# Patient Record
Sex: FEMALE | Race: WHITE | Employment: FULL TIME | URBAN - NONMETROPOLITAN AREA
[De-identification: names, ages, dates, MRNs, and addresses within clinical notes are randomized per-mention and may not be internally consistent; named-entity substitution may affect disease eponyms.]

---

## 2017-03-20 LAB
ALT SERPL-CCNC: 9 IU/L (ref 4–30)
AST SERPL-CCNC: 15 IU/L (ref 17–33)
CREAT SERPL-MCNC: 0.69 MG/DL (ref 0.6–1.3)
GLUCOSE SERPL-MCNC: 89 MG/DL (ref 78–113)
POTASSIUM SERPL-SCNC: 4.1 MMOL/L (ref 3.7–5)

## 2017-09-28 ENCOUNTER — TRANSFERRED RECORDS (OUTPATIENT)
Dept: HEALTH INFORMATION MANAGEMENT | Facility: HOSPITAL | Age: 32
End: 2017-09-28

## 2017-10-22 ENCOUNTER — HOSPITAL ENCOUNTER (INPATIENT)
Facility: HOSPITAL | Age: 32
LOS: 3 days | Discharge: HOME | End: 2017-10-25
Attending: OBSTETRICS & GYNECOLOGY | Admitting: OBSTETRICS & GYNECOLOGY
Payer: COMMERCIAL

## 2017-10-22 ENCOUNTER — TRANSFERRED RECORDS (OUTPATIENT)
Dept: HEALTH INFORMATION MANAGEMENT | Facility: HOSPITAL | Age: 32
End: 2017-10-22

## 2017-10-22 DIAGNOSIS — O92.29 SORE NIPPLES DUE TO LACTATION: ICD-10-CM

## 2017-10-22 DIAGNOSIS — D62 ANEMIA DUE TO BLOOD LOSS, ACUTE: ICD-10-CM

## 2017-10-22 PROBLEM — Z37.9 NORMAL LABOR: Status: ACTIVE | Noted: 2017-10-22

## 2017-10-22 LAB
AMPHETAMINES UR QL SCN: NEGATIVE
BARBITURATES UR QL: NEGATIVE
BASOPHILS # BLD AUTO: 0 10E9/L (ref 0–0.2)
BASOPHILS NFR BLD AUTO: 0.2 %
BENZODIAZ UR QL: NEGATIVE
CANNABINOIDS UR QL SCN: NEGATIVE
COCAINE UR QL: NEGATIVE
DIFFERENTIAL METHOD BLD: ABNORMAL
EOSINOPHIL # BLD AUTO: 0.1 10E9/L (ref 0–0.7)
EOSINOPHIL NFR BLD AUTO: 0.5 %
ERYTHROCYTE [DISTWIDTH] IN BLOOD BY AUTOMATED COUNT: 15.9 % (ref 10–15)
HCT VFR BLD AUTO: 37 % (ref 35–47)
HGB BLD-MCNC: 12.4 G/DL (ref 11.7–15.7)
IMM GRANULOCYTES # BLD: 0.1 10E9/L (ref 0–0.4)
IMM GRANULOCYTES NFR BLD: 0.5 %
LYMPHOCYTES # BLD AUTO: 2.2 10E9/L (ref 0.8–5.3)
LYMPHOCYTES NFR BLD AUTO: 17 %
MCH RBC QN AUTO: 27.3 PG (ref 26.5–33)
MCHC RBC AUTO-ENTMCNC: 33.5 G/DL (ref 31.5–36.5)
MCV RBC AUTO: 82 FL (ref 78–100)
METHADONE UR QL SCN: NEGATIVE
MONOCYTES # BLD AUTO: 0.9 10E9/L (ref 0–1.3)
MONOCYTES NFR BLD AUTO: 6.7 %
NEUTROPHILS # BLD AUTO: 9.7 10E9/L (ref 1.6–8.3)
NEUTROPHILS NFR BLD AUTO: 75.1 %
NRBC # BLD AUTO: 0 10*3/UL
NRBC BLD AUTO-RTO: 0 /100
OPIATES UR QL SCN: NEGATIVE
PCP UR QL SCN: NEGATIVE
PLATELET # BLD AUTO: 299 10E9/L (ref 150–450)
RBC # BLD AUTO: 4.54 10E12/L (ref 3.8–5.2)
WBC # BLD AUTO: 12.9 10E9/L (ref 4–11)

## 2017-10-22 PROCEDURE — 25000128 H RX IP 250 OP 636: Performed by: OBSTETRICS & GYNECOLOGY

## 2017-10-22 PROCEDURE — 86780 TREPONEMA PALLIDUM: CPT | Performed by: OBSTETRICS & GYNECOLOGY

## 2017-10-22 PROCEDURE — 12000027 ZZH R&B OB

## 2017-10-22 PROCEDURE — 86850 RBC ANTIBODY SCREEN: CPT | Performed by: OBSTETRICS & GYNECOLOGY

## 2017-10-22 PROCEDURE — 80307 DRUG TEST PRSMV CHEM ANLYZR: CPT | Performed by: OBSTETRICS & GYNECOLOGY

## 2017-10-22 PROCEDURE — 36415 COLL VENOUS BLD VENIPUNCTURE: CPT | Performed by: OBSTETRICS & GYNECOLOGY

## 2017-10-22 PROCEDURE — 86900 BLOOD TYPING SEROLOGIC ABO: CPT | Performed by: OBSTETRICS & GYNECOLOGY

## 2017-10-22 PROCEDURE — 86901 BLOOD TYPING SEROLOGIC RH(D): CPT | Performed by: OBSTETRICS & GYNECOLOGY

## 2017-10-22 PROCEDURE — 85025 COMPLETE CBC W/AUTO DIFF WBC: CPT | Performed by: OBSTETRICS & GYNECOLOGY

## 2017-10-22 RX ORDER — EPHEDRINE SULFATE 50 MG/ML
INJECTION, SOLUTION INTRAMUSCULAR; INTRAVENOUS; SUBCUTANEOUS
Status: DISCONTINUED
Start: 2017-10-22 | End: 2017-10-23 | Stop reason: HOSPADM

## 2017-10-22 RX ORDER — ACETAMINOPHEN 325 MG/1
650 TABLET ORAL EVERY 4 HOURS PRN
Status: DISCONTINUED | OUTPATIENT
Start: 2017-10-22 | End: 2017-10-23

## 2017-10-22 RX ORDER — NALOXONE HYDROCHLORIDE 0.4 MG/ML
.1-.4 INJECTION, SOLUTION INTRAMUSCULAR; INTRAVENOUS; SUBCUTANEOUS
Status: DISCONTINUED | OUTPATIENT
Start: 2017-10-22 | End: 2017-10-23

## 2017-10-22 RX ORDER — ONDANSETRON 2 MG/ML
4 INJECTION INTRAMUSCULAR; INTRAVENOUS EVERY 6 HOURS PRN
Status: DISCONTINUED | OUTPATIENT
Start: 2017-10-22 | End: 2017-10-23

## 2017-10-22 RX ORDER — METHYLERGONOVINE MALEATE 0.2 MG/ML
200 INJECTION INTRAVENOUS
Status: DISCONTINUED | OUTPATIENT
Start: 2017-10-22 | End: 2017-10-25 | Stop reason: HOSPADM

## 2017-10-22 RX ORDER — FENTANYL CITRATE 50 UG/ML
50-100 INJECTION, SOLUTION INTRAMUSCULAR; INTRAVENOUS
Status: DISCONTINUED | OUTPATIENT
Start: 2017-10-22 | End: 2017-10-25 | Stop reason: HOSPADM

## 2017-10-22 RX ORDER — IBUPROFEN 800 MG/1
800 TABLET, FILM COATED ORAL
Status: DISCONTINUED | OUTPATIENT
Start: 2017-10-22 | End: 2017-10-25 | Stop reason: HOSPADM

## 2017-10-22 RX ORDER — SODIUM CHLORIDE, SODIUM LACTATE, POTASSIUM CHLORIDE, CALCIUM CHLORIDE 600; 310; 30; 20 MG/100ML; MG/100ML; MG/100ML; MG/100ML
INJECTION, SOLUTION INTRAVENOUS CONTINUOUS
Status: DISCONTINUED | OUTPATIENT
Start: 2017-10-22 | End: 2017-10-25 | Stop reason: HOSPADM

## 2017-10-22 RX ORDER — OXYCODONE AND ACETAMINOPHEN 5; 325 MG/1; MG/1
1 TABLET ORAL
Status: DISCONTINUED | OUTPATIENT
Start: 2017-10-22 | End: 2017-10-25 | Stop reason: HOSPADM

## 2017-10-22 RX ORDER — CARBOPROST TROMETHAMINE 250 UG/ML
250 INJECTION, SOLUTION INTRAMUSCULAR
Status: DISCONTINUED | OUTPATIENT
Start: 2017-10-22 | End: 2017-10-25 | Stop reason: HOSPADM

## 2017-10-22 RX ORDER — OXYTOCIN/0.9 % SODIUM CHLORIDE 30/500 ML
100-340 PLASTIC BAG, INJECTION (ML) INTRAVENOUS CONTINUOUS PRN
Status: DISCONTINUED | OUTPATIENT
Start: 2017-10-22 | End: 2017-10-25 | Stop reason: HOSPADM

## 2017-10-22 RX ORDER — OXYTOCIN 10 [USP'U]/ML
10 INJECTION, SOLUTION INTRAMUSCULAR; INTRAVENOUS
Status: COMPLETED | OUTPATIENT
Start: 2017-10-22 | End: 2017-10-23

## 2017-10-22 RX ADMIN — SODIUM CHLORIDE, POTASSIUM CHLORIDE, SODIUM LACTATE AND CALCIUM CHLORIDE 500 ML: 600; 310; 30; 20 INJECTION, SOLUTION INTRAVENOUS at 00:15

## 2017-10-22 RX ADMIN — SODIUM CHLORIDE, POTASSIUM CHLORIDE, SODIUM LACTATE AND CALCIUM CHLORIDE 1000 ML: 600; 310; 30; 20 INJECTION, SOLUTION INTRAVENOUS at 02:00

## 2017-10-22 RX ADMIN — SODIUM CHLORIDE, POTASSIUM CHLORIDE, SODIUM LACTATE AND CALCIUM CHLORIDE: 600; 310; 30; 20 INJECTION, SOLUTION INTRAVENOUS at 00:45

## 2017-10-22 RX ADMIN — SODIUM CHLORIDE, POTASSIUM CHLORIDE, SODIUM LACTATE AND CALCIUM CHLORIDE 500 ML: 600; 310; 30; 20 INJECTION, SOLUTION INTRAVENOUS at 23:45

## 2017-10-22 NOTE — IP AVS SNAPSHOT
[facility]    [address] [address]    Phone:  [phone]    Fax:  [phone]                                       After Visit Summary   10/22/2017    Jodi Neri    MRN: [id number]           After Visit Summary Signature Page     I have received my discharge instructions, and my questions have been answered. I have discussed any challenges I see with this plan with the nurse or doctor.    ..........................................................................................................................................  Patient/Patient Representative Signature      ..........................................................................................................................................  Patient Representative Print Name and Relationship to Patient    ..................................................               ................................................  Date                                            Time    ..........................................................................................................................................  Reviewed by Signature/Title    ...................................................              ..............................................  Date                                                            Time

## 2017-10-22 NOTE — IP AVS SNAPSHOT
MRN:[id number]                      After Visit Summary   10/22/2017    Jodi Neri    MRN: [id number]           Thank you!     Thank you for choosing [facility] for your care. Our goal is always to provide you with excellent care. Hearing back from our patients is one way we can continue to improve our services. Please take a few minutes to complete the written survey that you may receive in the mail after you visit with us. Thank you!        Patient Information     Date Of Birth          1985        Designated Caregiver       Most Recent Value    Caregiver    Will someone help with your care after discharge? yes    Name of designated caregiver Austin     Phone number of caregiver [phone]    Caregiver address [address], [address]      About your hospital stay     You were admitted on:  2017 You last received care in the:  [facility]    You were discharged on:  2017        Reason for your hospital stay       Low segment transverse  section done for fetal intolerance of labor.                  Who to Call     For medical emergencies, please call 911.  For non-urgent questions about your medical care, please call your primary care provider or clinic, [phone]  For questions related to your surgery, please call your surgery clinic        Attending Provider     Provider Specialty    Kunal Aguilar MD OB/Gyn       Primary Care Provider Office Phone # Fax #    Michael ROSALBA Garcia [phone]      After Care Instructions     Activity       Your activity upon discharge: activity as tolerated    Avoid sex 6 weeks.            Diet       Follow this diet upon discharge: Regular            Wound care and dressings       Instructions to care for your wound at home: keep wound clean and dry.  Wound may be washed with water and dried.                  Follow-up Appointments     Follow-up and recommended labs and tests         Staples may be removed at [facility] next week Monday.  Post partum follow up in 6 weeks at [facility].  See me here if having problems with the surgery.                  Further instructions from your care team       Follow up appointment with Dr. Crespo on 2017 at 11:20 AM.    Postop  Birth Instructions    Activity       Do not lift more than 10 pounds for 6 weeks after surgery.  Ask family and friends for help when you need it.    No driving until you have stopped taking your pain medications (usually two weeks after surgery).    No heavy exercise or activity for 6 weeks.  Don't do anything that will put a strain on your surgery site.    Don't strain when using the toilet.  Your care team may prescribe a stool softener if you have problems with your bowel movements.     To care for your incision:       Keep the incision clean and dry.    Do not soak your incision in water. No swimming or hot tubs until it has fully healed. You may soak in the bathtub if the water level is below your incision.    Do not use peroxide, gel, cream, lotion, or ointment on your incision.    Adjust your clothes to avoid pressure on your surgery site (check the elastic in your underwear for example).     You may see a small amount of clear or pink drainage and this is normal.  Check with your health care provider:       If the drainage increases or has an odor.    If the incision reddens, you have swelling, or develop a rash.    If you have increased pain and the medicine we prescribed doesn't help.    If you have a fever above 100.4 F (38 C) with or without chills when placing thermometer under your tongue.   The area around your incision (surgery wound), will feel numb.  This is normal. The numbness should go away in less than a year.     Keep your hands clean:  Always wash your hands before touching your incision (surgery wound). This helps reduce your risk of infection. If your hands  "aren't dirty, you may use an alcohol hand-rub to clean your hands. Keep your nails clean and short.    Call your healthcare provider if you have any of these symptoms:       You soak a sanitary pad with blood within 1 hour, or you see blood clots larger than a golf ball.    Bleeding that lasts more than 6 weeks.    Vaginal discharge that smells bad.    Severe pain, cramping or tenderness in your lower belly area.    A need to urinate more frequently (use the toilet more often), more urgently (use the toilet very quickly), or it burns when you urinate.    Nausea and vomiting.    Redness, swelling or pain around a vein in your leg.    Problems breastfeeding or a red or painful area on your breast.    Chest pain and cough or are gasping for air.    Problems with coping with sadness, anxiety or depression. If you have concerns about hurting yourself or the baby, call your provider immediately.      You have questions or concerns after you return home.                  Pending Results     No orders found from 10/20/2017 to 10/23/2017.            Statement of Approval     Ordered          10/25/17 0906  I have reviewed and agree with all the recommendations and orders detailed in this document.  EFFECTIVE NOW     Approved and electronically signed by:  Kunal Aguilar MD             Admission Information     Date & Time Provider Department Dept. Phone    10/22/2017 Kunal Aguilar MD [facility] [phone]      Your Vitals Were     Blood Pressure Pulse Temperature Respirations Last Period Pulse Oximetry    122/62 98 98.3  F (36.8  C) (Oral) 16 01/19/2017 96%      MyChart Information     [facility] lets you send messages to your doctor, view your test results, renew your prescriptions, schedule appointments and more. To sign up, go to [email or web address] . Click on \"Log in\" on the left side of the screen, which will take you to the Welcome page. Then click on \"Sign up Now\" on the right side of the page.     You " will be asked to enter the access code listed below, as well as some personal information. Please follow the directions to create your username and password.     Your access code is: [email or web address]  Expires: 2018  8:29 AM     Your access code will  in 90 days. If you need help or a new code, please call your [facility] clinic or [phone].        Care EveryWhere ID     This is your Care EveryWhere ID. This could be used by other organizations to access your [facility] medical records  [id number]        Equal Access to Services     [facility]: Hadii bert hebert Sobarb, waaxda ludanaeadaha, qaybfrancisco javier kaalmamarlon anguiano, iglesia carrington . So [facility] [phone].    ATENCIÓN: Si habla español, tiene a serna disposición servicios gratuitos de asistencia lingüística. Llame al [phone].    We comply with applicable federal civil rights laws and [place] laws. We do not discriminate on the basis of race, color, national origin, age, disability, sex, sexual orientation, or gender identity.               Review of your medicines      START taking        Dose / Directions    cephALEXin 500 MG capsule   Commonly known as:  KEFLEX   Indication:  Possible post-op infection, code pink  section        Dose:  500 mg   Take 1 capsule (500 mg) by mouth 3 times daily for 6 days   Quantity:  18 capsule   Refills:  0       ferrous sulfate 325 (65 FE) MG tablet   Commonly known as:  IRON   Used for:  Anemia due to blood loss, acute        Dose:  325 mg   Take 1 tablet (325 mg) by mouth 2 times daily   Quantity:  100 tablet   Refills:  1       ibuprofen 400 MG tablet   Commonly known as:  ADVIL/MOTRIN        Dose:  800 mg   Take 2 tablets (800 mg) by mouth every 6 hours as needed for other (cramping)   Quantity:  100 tablet   Refills:  2       lanolin ointment   Used for:  Sore nipples due to lactation        Apply topically every hour as needed for dry skin (soreness)   Quantity:  40 g   Refills:   3       oxyCODONE 5 MG IR tablet   Commonly known as:  ROXICODONE        Dose:  5 mg   Take 1 tablet (5 mg) by mouth every 3 hours as needed for moderate to severe pain   Quantity:  36 tablet   Refills:  0       senna-docusate 8.6-50 MG per tablet   Commonly known as:  SENOKOT-S;PERICOLACE        Dose:  1-2 tablet   Take 1-2 tablets by mouth 2 times daily   Quantity:  60 tablet   Refills:  1         CONTINUE these medicines which have NOT CHANGED        Dose / Directions    fluticasone 50 MCG/ACT spray   Commonly known as:  FLONASE        Dose:  50 spray   Spray 50 sprays into both nostrils daily   Refills:  0       loratadine 10 MG tablet   Commonly known as:  CLARITIN        Dose:  10 mg   Take 10 mg by mouth daily as needed   Refills:  0       prenatal multivitamin plus iron 27-0.8 MG Tabs per tablet        Dose:  1 tablet   Take 1 tablet by mouth daily   Refills:  0       prochlorperazine 10 MG tablet   Commonly known as:  COMPAZINE        Dose:  5 mg   Take 5 mg by mouth daily as needed   Refills:  0            Where to get your medicines      These medications were sent to [id number] - [place] [place], [place] - [address]  [address], [facility] [address]     Phone:  [phone]     cephALEXin 500 MG capsule    ferrous sulfate 325 (65 FE) MG tablet    ibuprofen 400 MG tablet    lanolin ointment    senna-docusate 8.6-50 MG per tablet         Some of these will need a paper prescription and others can be bought over the counter. Ask your nurse if you have questions.     Bring a paper prescription for each of these medications     oxyCODONE 5 MG IR tablet               ANTIBIOTIC INSTRUCTION     You've Been Prescribed an Antibiotic - Now What?  Your healthcare team thinks that you or your loved one might have an infection. Some infections can be treated with antibiotics, which are powerful, life-saving drugs. Like all medications, antibiotics have side effects and should only be used when  necessary. There are some important things you should know about your antibiotic treatment.      Your healthcare team may run tests before you start taking an antibiotic.    Your team may take samples (e.g., from your blood, urine or other areas) to run tests to look for bacteria. These test can be important to determine if you need an antibiotic at all and, if you do, which antibiotic will work best.      Within a few days, your healthcare team might change or even stop your antibiotic.    Your team may start you on an antibiotic while they are working to find out what is making you sick.    Your team might change your antibiotic because test results show that a different antibiotic would be better to treat your infection.    In some cases, once your team has more information, they learn that you do not need an antibiotic at all. They may find out that you don't have an infection, or that the antibiotic you're taking won't work against your infection. For example, an infection caused by a virus can't be treated with antibiotics. Staying on an antibiotic when you don't need it is more likely to be harmful than helpful.      You may experience side effects from your antibiotic.    Like all medications, antibiotics have side effects. Some of these can be serious.    Let you healthcare team know if you have any known allergies when you are admitted to the hospital.    One significant side effect of nearly all antibiotics is the risk of severe and sometimes deadly diarrhea caused by Clostridium difficile (C. Difficile). This occurs when a person takes antibiotics because some good germs are destroyed. Antibiotic use allows C. diificile to take over, putting patients at high risk for this serious infection.    As a patient or caregiver, it is important to understand your or your loved one's antibiotic treatment. It is especially important for caregivers to speak up when patients can't speak for themselves. Here are some  important questions to ask your healthcare team.    What infection is this antibiotic treating and how do you know I have that infection?    What side effects might occur from this antibiotic?    How long will I need to take this antibiotic?    Is it safe to take this antibiotic with other medications or supplements (e.g., vitamins) that I am taking?     Are there any special directions I need to know about taking this antibiotic? For example, should I take it with food?    How will I be monitored to know whether my infection is responding to the antibiotic?    What tests may help to make sure the right antibiotic is prescribed for me?      Information provided by:  www.cdc.gov/getsmart  U.S. Department of Health and Human Services  Centers for disease Control and Prevention  National Center for Emerging and Zoonotic Infectious Diseases  Division of Healthcare Quality Promotion         Protect others around you: Learn how to safely use, store and throw away your medicines at www.disposemymeds.org.             Medication List: This is a list of all your medications and when to take them. Check marks below indicate your daily home schedule. Keep this list as a reference.      Medications           Morning Afternoon Evening Bedtime As Needed    cephALEXin 500 MG capsule   Commonly known as:  KEFLEX   Take 1 capsule (500 mg) by mouth 3 times daily for 6 days   Last time this was given:  500 mg on 10/25/2017  2:20 PM                                ferrous sulfate 325 (65 FE) MG tablet   Commonly known as:  IRON   Take 1 tablet (325 mg) by mouth 2 times daily   Last time this was given:  325 mg on 10/25/2017  9:37 AM                                fluticasone 50 MCG/ACT spray   Commonly known as:  FLONASE   Spray 50 sprays into both nostrils daily                                ibuprofen 400 MG tablet   Commonly known as:  ADVIL/MOTRIN   Take 2 tablets (800 mg) by mouth every 6 hours as needed for other (cramping)   Last  time this was given:  800 mg on 10/25/2017  8:08 AM                                lanolin ointment   Apply topically every hour as needed for dry skin (soreness)                                loratadine 10 MG tablet   Commonly known as:  CLARITIN   Take 10 mg by mouth daily as needed                                oxyCODONE 5 MG IR tablet   Commonly known as:  ROXICODONE   Take 1 tablet (5 mg) by mouth every 3 hours as needed for moderate to severe pain   Last time this was given:  10 mg on 10/25/2017  2:19 PM                                prenatal multivitamin plus iron 27-0.8 MG Tabs per tablet   Take 1 tablet by mouth daily                                prochlorperazine 10 MG tablet   Commonly known as:  COMPAZINE   Take 5 mg by mouth daily as needed                                senna-docusate 8.6-50 MG per tablet   Commonly known as:  SENOKOT-S;PERICOLACE   Take 1-2 tablets by mouth 2 times daily   Last time this was given:  1 tablet on 10/25/2017  9:37 AM

## 2017-10-23 ENCOUNTER — ANESTHESIA EVENT (OUTPATIENT)
Dept: SURGERY | Facility: HOSPITAL | Age: 32
End: 2017-10-23
Payer: COMMERCIAL

## 2017-10-23 ENCOUNTER — ANESTHESIA EVENT (OUTPATIENT)
Dept: OBGYN | Facility: HOSPITAL | Age: 32
End: 2017-10-23
Payer: COMMERCIAL

## 2017-10-23 ENCOUNTER — APPOINTMENT (OUTPATIENT)
Dept: GENERAL RADIOLOGY | Facility: HOSPITAL | Age: 32
End: 2017-10-23
Attending: OBSTETRICS & GYNECOLOGY
Payer: COMMERCIAL

## 2017-10-23 ENCOUNTER — ANESTHESIA (OUTPATIENT)
Dept: OBGYN | Facility: HOSPITAL | Age: 32
End: 2017-10-23
Payer: COMMERCIAL

## 2017-10-23 ENCOUNTER — ANESTHESIA (OUTPATIENT)
Dept: SURGERY | Facility: HOSPITAL | Age: 32
End: 2017-10-23
Payer: COMMERCIAL

## 2017-10-23 LAB
ABO + RH BLD: NORMAL
ABO + RH BLD: NORMAL
BLD GP AB SCN SERPL QL: NORMAL
BLOOD BANK CMNT PATIENT-IMP: NORMAL
BLOOD BANK CMNT PATIENT-IMP: NORMAL
SPECIMEN EXP DATE BLD: NORMAL

## 2017-10-23 PROCEDURE — 25000128 H RX IP 250 OP 636: Performed by: OBSTETRICS & GYNECOLOGY

## 2017-10-23 PROCEDURE — 27210794 ZZH OR GENERAL SUPPLY STERILE: Performed by: OBSTETRICS & GYNECOLOGY

## 2017-10-23 PROCEDURE — 3E0R3BZ INTRODUCTION OF ANESTHETIC AGENT INTO SPINAL CANAL, PERCUTANEOUS APPROACH: ICD-10-PCS | Performed by: NURSE ANESTHETIST, CERTIFIED REGISTERED

## 2017-10-23 PROCEDURE — 25000132 ZZH RX MED GY IP 250 OP 250 PS 637: Performed by: OBSTETRICS & GYNECOLOGY

## 2017-10-23 PROCEDURE — 25000125 ZZHC RX 250: Performed by: NURSE ANESTHETIST, CERTIFIED REGISTERED

## 2017-10-23 PROCEDURE — 25000128 H RX IP 250 OP 636: Performed by: NURSE ANESTHETIST, CERTIFIED REGISTERED

## 2017-10-23 PROCEDURE — 40000275 ZZH STATISTIC RCP TIME EA 10 MIN

## 2017-10-23 PROCEDURE — 59514 CESAREAN DELIVERY ONLY: CPT | Performed by: OBSTETRICS & GYNECOLOGY

## 2017-10-23 PROCEDURE — S0020 INJECTION, BUPIVICAINE HYDRO: HCPCS | Performed by: NURSE ANESTHETIST, CERTIFIED REGISTERED

## 2017-10-23 PROCEDURE — 36000058 ZZH SURGERY LEVEL 3 EA 15 ADDTL MIN: Performed by: OBSTETRICS & GYNECOLOGY

## 2017-10-23 PROCEDURE — 00HU33Z INSERTION OF INFUSION DEVICE INTO SPINAL CANAL, PERCUTANEOUS APPROACH: ICD-10-PCS | Performed by: NURSE ANESTHETIST, CERTIFIED REGISTERED

## 2017-10-23 PROCEDURE — 37000011 ZZH ANESTHESIA WARD SERVICE: Performed by: NURSE ANESTHETIST, CERTIFIED REGISTERED

## 2017-10-23 PROCEDURE — 12000031 ZZH R&B OB CRITICAL

## 2017-10-23 PROCEDURE — 01999 UNLISTED ANES PROCEDURE: CPT | Performed by: NURSE ANESTHETIST, CERTIFIED REGISTERED

## 2017-10-23 PROCEDURE — 71000015 ZZH RECOVERY PHASE 1 LEVEL 2 EA ADDTL HR: Performed by: OBSTETRICS & GYNECOLOGY

## 2017-10-23 PROCEDURE — 25000125 ZZHC RX 250: Performed by: OBSTETRICS & GYNECOLOGY

## 2017-10-23 PROCEDURE — 37000008 ZZH ANESTHESIA TECHNICAL FEE, 1ST 30 MIN: Performed by: OBSTETRICS & GYNECOLOGY

## 2017-10-23 PROCEDURE — 71000014 ZZH RECOVERY PHASE 1 LEVEL 2 FIRST HR: Performed by: OBSTETRICS & GYNECOLOGY

## 2017-10-23 PROCEDURE — 25000566 ZZH SEVOFLURANE, EA 15 MIN: Performed by: NURSE ANESTHETIST, CERTIFIED REGISTERED

## 2017-10-23 PROCEDURE — 37000009 ZZH ANESTHESIA TECHNICAL FEE, EACH ADDTL 15 MIN: Performed by: OBSTETRICS & GYNECOLOGY

## 2017-10-23 PROCEDURE — 40000985 XR ABDOMEN PORT F1 VW: Mod: TC

## 2017-10-23 PROCEDURE — 36000056 ZZH SURGERY LEVEL 3 1ST 30 MIN: Performed by: OBSTETRICS & GYNECOLOGY

## 2017-10-23 RX ORDER — EPHEDRINE SULFATE 50 MG/ML
5 INJECTION, SOLUTION INTRAMUSCULAR; INTRAVENOUS; SUBCUTANEOUS
Status: DISCONTINUED | OUTPATIENT
Start: 2017-10-23 | End: 2017-10-25 | Stop reason: HOSPADM

## 2017-10-23 RX ORDER — LIDOCAINE 40 MG/G
CREAM TOPICAL
Status: DISCONTINUED | OUTPATIENT
Start: 2017-10-23 | End: 2017-10-25 | Stop reason: HOSPADM

## 2017-10-23 RX ORDER — MISOPROSTOL 200 UG/1
400 TABLET ORAL
Status: DISCONTINUED | OUTPATIENT
Start: 2017-10-23 | End: 2017-10-25 | Stop reason: HOSPADM

## 2017-10-23 RX ORDER — ONDANSETRON 4 MG/1
4 TABLET, ORALLY DISINTEGRATING ORAL EVERY 6 HOURS PRN
Status: DISCONTINUED | OUTPATIENT
Start: 2017-10-23 | End: 2017-10-25 | Stop reason: HOSPADM

## 2017-10-23 RX ORDER — PROPOFOL 10 MG/ML
INJECTION, EMULSION INTRAVENOUS PRN
Status: DISCONTINUED | OUTPATIENT
Start: 2017-10-23 | End: 2017-10-23

## 2017-10-23 RX ORDER — FENTANYL CITRATE 50 UG/ML
INJECTION, SOLUTION INTRAMUSCULAR; INTRAVENOUS PRN
Status: DISCONTINUED | OUTPATIENT
Start: 2017-10-23 | End: 2017-10-23

## 2017-10-23 RX ORDER — ROPIVACAINE HYDROCHLORIDE 2 MG/ML
8 INJECTION, SOLUTION EPIDURAL; INFILTRATION; PERINEURAL ONCE
Status: DISCONTINUED | OUTPATIENT
Start: 2017-10-23 | End: 2017-10-25 | Stop reason: HOSPADM

## 2017-10-23 RX ORDER — IBUPROFEN 400 MG/1
400-800 TABLET, FILM COATED ORAL EVERY 6 HOURS PRN
Status: DISCONTINUED | OUTPATIENT
Start: 2017-10-23 | End: 2017-10-25 | Stop reason: HOSPADM

## 2017-10-23 RX ORDER — PRENATAL VIT/IRON FUM/FOLIC AC 27MG-0.8MG
1 TABLET ORAL DAILY
COMMUNITY
Start: 2017-03-02

## 2017-10-23 RX ORDER — LANOLIN 100 %
OINTMENT (GRAM) TOPICAL
Status: DISCONTINUED | OUTPATIENT
Start: 2017-10-23 | End: 2017-10-25 | Stop reason: HOSPADM

## 2017-10-23 RX ORDER — BACITRACIN ZINC 500 [USP'U]/G
OINTMENT TOPICAL PRN
Status: DISCONTINUED | OUTPATIENT
Start: 2017-10-23 | End: 2017-10-23 | Stop reason: HOSPADM

## 2017-10-23 RX ORDER — CEFAZOLIN SODIUM 1 G/3ML
INJECTION, POWDER, FOR SOLUTION INTRAMUSCULAR; INTRAVENOUS PRN
Status: DISCONTINUED | OUTPATIENT
Start: 2017-10-23 | End: 2017-10-23

## 2017-10-23 RX ORDER — OXYTOCIN/0.9 % SODIUM CHLORIDE 30/500 ML
340 PLASTIC BAG, INJECTION (ML) INTRAVENOUS CONTINUOUS PRN
Status: DISCONTINUED | OUTPATIENT
Start: 2017-10-23 | End: 2017-10-25 | Stop reason: HOSPADM

## 2017-10-23 RX ORDER — DEXTROSE, SODIUM CHLORIDE, SODIUM LACTATE, POTASSIUM CHLORIDE, AND CALCIUM CHLORIDE 5; .6; .31; .03; .02 G/100ML; G/100ML; G/100ML; G/100ML; G/100ML
INJECTION, SOLUTION INTRAVENOUS CONTINUOUS
Status: DISCONTINUED | OUTPATIENT
Start: 2017-10-23 | End: 2017-10-25 | Stop reason: HOSPADM

## 2017-10-23 RX ORDER — HYDROMORPHONE HYDROCHLORIDE 1 MG/ML
.3-.5 INJECTION, SOLUTION INTRAMUSCULAR; INTRAVENOUS; SUBCUTANEOUS EVERY 30 MIN PRN
Status: DISCONTINUED | OUTPATIENT
Start: 2017-10-23 | End: 2017-10-25 | Stop reason: HOSPADM

## 2017-10-23 RX ORDER — PROCHLORPERAZINE MALEATE 10 MG
5 TABLET ORAL DAILY PRN
COMMUNITY
Start: 2016-05-27

## 2017-10-23 RX ORDER — LABETALOL HYDROCHLORIDE 5 MG/ML
10 INJECTION, SOLUTION INTRAVENOUS
Status: DISCONTINUED | OUTPATIENT
Start: 2017-10-23 | End: 2017-10-23 | Stop reason: HOSPADM

## 2017-10-23 RX ORDER — BUPIVACAINE HYDROCHLORIDE 2.5 MG/ML
INJECTION, SOLUTION INFILTRATION; PERINEURAL PRN
Status: DISCONTINUED | OUTPATIENT
Start: 2017-10-23 | End: 2019-03-21

## 2017-10-23 RX ORDER — FLUTICASONE PROPIONATE 50 MCG
50 SPRAY, SUSPENSION (ML) NASAL DAILY
COMMUNITY
Start: 2017-09-11

## 2017-10-23 RX ORDER — SUFENTANIL CITRATE 50 UG/ML
INJECTION EPIDURAL; INTRAVENOUS
Status: COMPLETED
Start: 2017-10-23 | End: 2017-10-23

## 2017-10-23 RX ORDER — OXYCODONE HYDROCHLORIDE 5 MG/1
5-10 TABLET ORAL
Status: DISCONTINUED | OUTPATIENT
Start: 2017-10-23 | End: 2017-10-25 | Stop reason: HOSPADM

## 2017-10-23 RX ORDER — BISACODYL 10 MG
10 SUPPOSITORY, RECTAL RECTAL DAILY PRN
Status: DISCONTINUED | OUTPATIENT
Start: 2017-10-25 | End: 2017-10-25 | Stop reason: HOSPADM

## 2017-10-23 RX ORDER — OXYTOCIN/0.9 % SODIUM CHLORIDE 30/500 ML
100 PLASTIC BAG, INJECTION (ML) INTRAVENOUS CONTINUOUS
Status: DISCONTINUED | OUTPATIENT
Start: 2017-10-23 | End: 2017-10-25 | Stop reason: HOSPADM

## 2017-10-23 RX ORDER — ONDANSETRON 4 MG/1
4 TABLET, ORALLY DISINTEGRATING ORAL EVERY 30 MIN PRN
Status: DISCONTINUED | OUTPATIENT
Start: 2017-10-23 | End: 2017-10-23 | Stop reason: HOSPADM

## 2017-10-23 RX ORDER — LIDOCAINE HYDROCHLORIDE AND EPINEPHRINE 15; 5 MG/ML; UG/ML
INJECTION, SOLUTION EPIDURAL PRN
Status: DISCONTINUED | OUTPATIENT
Start: 2017-10-23 | End: 2017-10-23

## 2017-10-23 RX ORDER — ONDANSETRON 2 MG/ML
4 INJECTION INTRAMUSCULAR; INTRAVENOUS EVERY 6 HOURS PRN
Status: DISCONTINUED | OUTPATIENT
Start: 2017-10-23 | End: 2017-10-25 | Stop reason: HOSPADM

## 2017-10-23 RX ORDER — ONDANSETRON 2 MG/ML
4 INJECTION INTRAMUSCULAR; INTRAVENOUS EVERY 30 MIN PRN
Status: DISCONTINUED | OUTPATIENT
Start: 2017-10-23 | End: 2017-10-23 | Stop reason: HOSPADM

## 2017-10-23 RX ORDER — ACETAMINOPHEN 325 MG/1
650 TABLET ORAL EVERY 4 HOURS PRN
Status: DISCONTINUED | OUTPATIENT
Start: 2017-10-26 | End: 2017-10-25 | Stop reason: HOSPADM

## 2017-10-23 RX ORDER — CARBOPROST TROMETHAMINE 250 UG/ML
INJECTION, SOLUTION INTRAMUSCULAR PRN
Status: DISCONTINUED | OUTPATIENT
Start: 2017-10-23 | End: 2017-10-23

## 2017-10-23 RX ORDER — AMOXICILLIN 250 MG
1-2 CAPSULE ORAL 2 TIMES DAILY
Status: DISCONTINUED | OUTPATIENT
Start: 2017-10-23 | End: 2017-10-25 | Stop reason: HOSPADM

## 2017-10-23 RX ORDER — CEFAZOLIN SODIUM 2 G/100ML
2 INJECTION, SOLUTION INTRAVENOUS EVERY 8 HOURS
Status: DISCONTINUED | OUTPATIENT
Start: 2017-10-23 | End: 2017-10-24

## 2017-10-23 RX ORDER — LORATADINE 10 MG/1
10 TABLET ORAL DAILY PRN
COMMUNITY
Start: 2016-09-28

## 2017-10-23 RX ORDER — SODIUM CHLORIDE, SODIUM LACTATE, POTASSIUM CHLORIDE, CALCIUM CHLORIDE 600; 310; 30; 20 MG/100ML; MG/100ML; MG/100ML; MG/100ML
INJECTION, SOLUTION INTRAVENOUS CONTINUOUS
Status: DISCONTINUED | OUTPATIENT
Start: 2017-10-23 | End: 2017-10-23 | Stop reason: HOSPADM

## 2017-10-23 RX ORDER — OXYTOCIN 10 [USP'U]/ML
10 INJECTION, SOLUTION INTRAMUSCULAR; INTRAVENOUS
Status: DISCONTINUED | OUTPATIENT
Start: 2017-10-23 | End: 2017-10-25 | Stop reason: HOSPADM

## 2017-10-23 RX ORDER — MEPERIDINE HYDROCHLORIDE 25 MG/ML
12.5 INJECTION INTRAMUSCULAR; INTRAVENOUS; SUBCUTANEOUS EVERY 5 MIN PRN
Status: DISCONTINUED | OUTPATIENT
Start: 2017-10-23 | End: 2017-10-23 | Stop reason: HOSPADM

## 2017-10-23 RX ORDER — NALBUPHINE HYDROCHLORIDE 20 MG/ML
2.5-5 INJECTION, SOLUTION INTRAMUSCULAR; INTRAVENOUS; SUBCUTANEOUS EVERY 6 HOURS PRN
Status: DISCONTINUED | OUTPATIENT
Start: 2017-10-23 | End: 2017-10-25 | Stop reason: HOSPADM

## 2017-10-23 RX ORDER — DEXAMETHASONE SODIUM PHOSPHATE 4 MG/ML
4 INJECTION, SOLUTION INTRA-ARTICULAR; INTRALESIONAL; INTRAMUSCULAR; INTRAVENOUS; SOFT TISSUE
Status: DISCONTINUED | OUTPATIENT
Start: 2017-10-23 | End: 2017-10-23 | Stop reason: HOSPADM

## 2017-10-23 RX ORDER — ONDANSETRON 2 MG/ML
4 INJECTION INTRAMUSCULAR; INTRAVENOUS EVERY 6 HOURS PRN
Status: DISCONTINUED | OUTPATIENT
Start: 2017-10-23 | End: 2017-10-23

## 2017-10-23 RX ORDER — FENTANYL CITRATE 50 UG/ML
25-50 INJECTION, SOLUTION INTRAMUSCULAR; INTRAVENOUS
Status: DISCONTINUED | OUTPATIENT
Start: 2017-10-23 | End: 2017-10-23 | Stop reason: HOSPADM

## 2017-10-23 RX ORDER — MORPHINE SULFATE 2 MG/ML
2-4 INJECTION, SOLUTION INTRAMUSCULAR; INTRAVENOUS EVERY 5 MIN PRN
Status: DISCONTINUED | OUTPATIENT
Start: 2017-10-23 | End: 2017-10-23 | Stop reason: HOSPADM

## 2017-10-23 RX ORDER — SIMETHICONE 80 MG
80 TABLET,CHEWABLE ORAL 4 TIMES DAILY PRN
Status: DISCONTINUED | OUTPATIENT
Start: 2017-10-23 | End: 2017-10-25 | Stop reason: HOSPADM

## 2017-10-23 RX ORDER — ACETAMINOPHEN 325 MG/1
975 TABLET ORAL EVERY 8 HOURS
Status: DISCONTINUED | OUTPATIENT
Start: 2017-10-23 | End: 2017-10-25 | Stop reason: HOSPADM

## 2017-10-23 RX ORDER — SUFENTANIL CITRATE 50 UG/ML
INJECTION EPIDURAL; INTRAVENOUS PRN
Status: DISCONTINUED | OUTPATIENT
Start: 2017-10-23 | End: 2019-03-21

## 2017-10-23 RX ORDER — DIPHENHYDRAMINE HCL 25 MG
25 CAPSULE ORAL EVERY 6 HOURS PRN
Status: DISCONTINUED | OUTPATIENT
Start: 2017-10-23 | End: 2017-10-25 | Stop reason: HOSPADM

## 2017-10-23 RX ORDER — NALOXONE HYDROCHLORIDE 0.4 MG/ML
.1-.4 INJECTION, SOLUTION INTRAMUSCULAR; INTRAVENOUS; SUBCUTANEOUS
Status: DISCONTINUED | OUTPATIENT
Start: 2017-10-23 | End: 2017-10-25 | Stop reason: HOSPADM

## 2017-10-23 RX ORDER — NALOXONE HYDROCHLORIDE 0.4 MG/ML
.1-.4 INJECTION, SOLUTION INTRAMUSCULAR; INTRAVENOUS; SUBCUTANEOUS
Status: DISCONTINUED | OUTPATIENT
Start: 2017-10-23 | End: 2017-10-23

## 2017-10-23 RX ORDER — HYDROCORTISONE 2.5 %
CREAM (GRAM) TOPICAL 3 TIMES DAILY PRN
Status: DISCONTINUED | OUTPATIENT
Start: 2017-10-23 | End: 2017-10-25 | Stop reason: HOSPADM

## 2017-10-23 RX ORDER — KETOROLAC TROMETHAMINE 30 MG/ML
15 INJECTION, SOLUTION INTRAMUSCULAR; INTRAVENOUS EVERY 6 HOURS
Status: COMPLETED | OUTPATIENT
Start: 2017-10-23 | End: 2017-10-23

## 2017-10-23 RX ORDER — DIPHENHYDRAMINE HYDROCHLORIDE 50 MG/ML
25 INJECTION INTRAMUSCULAR; INTRAVENOUS EVERY 6 HOURS PRN
Status: DISCONTINUED | OUTPATIENT
Start: 2017-10-23 | End: 2017-10-25 | Stop reason: HOSPADM

## 2017-10-23 RX ADMIN — KETOROLAC TROMETHAMINE 15 MG: 30 INJECTION, SOLUTION INTRAMUSCULAR; INTRAVENOUS at 17:22

## 2017-10-23 RX ADMIN — OXYCODONE HYDROCHLORIDE 10 MG: 5 TABLET ORAL at 12:27

## 2017-10-23 RX ADMIN — Medication 100 MG: at 02:30

## 2017-10-23 RX ADMIN — ACETAMINOPHEN 975 MG: 325 TABLET, FILM COATED ORAL at 12:14

## 2017-10-23 RX ADMIN — FENTANYL CITRATE 50 MCG: 50 INJECTION, SOLUTION INTRAMUSCULAR; INTRAVENOUS at 03:58

## 2017-10-23 RX ADMIN — FENTANYL CITRATE 50 MCG: 50 INJECTION, SOLUTION INTRAMUSCULAR; INTRAVENOUS at 04:30

## 2017-10-23 RX ADMIN — ONDANSETRON 4 MG: 2 INJECTION INTRAMUSCULAR; INTRAVENOUS at 03:39

## 2017-10-23 RX ADMIN — SODIUM CHLORIDE, POTASSIUM CHLORIDE, SODIUM LACTATE AND CALCIUM CHLORIDE: 600; 310; 30; 20 INJECTION, SOLUTION INTRAVENOUS at 02:25

## 2017-10-23 RX ADMIN — PROPOFOL 200 MG: 10 INJECTION, EMULSION INTRAVENOUS at 02:30

## 2017-10-23 RX ADMIN — OXYCODONE HYDROCHLORIDE 10 MG: 5 TABLET ORAL at 22:41

## 2017-10-23 RX ADMIN — CEFAZOLIN SODIUM 2 G: 2 INJECTION, SOLUTION INTRAVENOUS at 20:01

## 2017-10-23 RX ADMIN — ACETAMINOPHEN 975 MG: 325 TABLET, FILM COATED ORAL at 20:16

## 2017-10-23 RX ADMIN — FENTANYL CITRATE 50 MCG: 50 INJECTION, SOLUTION INTRAMUSCULAR; INTRAVENOUS at 03:45

## 2017-10-23 RX ADMIN — KETOROLAC TROMETHAMINE 15 MG: 30 INJECTION, SOLUTION INTRAMUSCULAR; INTRAVENOUS at 05:15

## 2017-10-23 RX ADMIN — BUPIVACAINE HYDROCHLORIDE 1.8 ML: 2.5 INJECTION, SOLUTION INFILTRATION; PERINEURAL at 01:48

## 2017-10-23 RX ADMIN — CEFAZOLIN 2 G: 1 INJECTION, POWDER, FOR SOLUTION INTRAMUSCULAR; INTRAVENOUS at 02:43

## 2017-10-23 RX ADMIN — Medication 8 ML/HR: at 00:27

## 2017-10-23 RX ADMIN — KETOROLAC TROMETHAMINE 15 MG: 30 INJECTION, SOLUTION INTRAMUSCULAR; INTRAVENOUS at 11:44

## 2017-10-23 RX ADMIN — DIPHENHYDRAMINE HYDROCHLORIDE 25 MG: 50 INJECTION, SOLUTION INTRAMUSCULAR; INTRAVENOUS at 09:30

## 2017-10-23 RX ADMIN — ACETAMINOPHEN 975 MG: 325 TABLET, FILM COATED ORAL at 05:15

## 2017-10-23 RX ADMIN — Medication 3 ML: at 00:14

## 2017-10-23 RX ADMIN — OXYCODONE HYDROCHLORIDE 10 MG: 5 TABLET ORAL at 08:55

## 2017-10-23 RX ADMIN — OXYCODONE HYDROCHLORIDE 10 MG: 5 TABLET ORAL at 05:40

## 2017-10-23 RX ADMIN — FENTANYL CITRATE 50 MCG: 50 INJECTION, SOLUTION INTRAMUSCULAR; INTRAVENOUS at 04:11

## 2017-10-23 RX ADMIN — OXYCODONE HYDROCHLORIDE 10 MG: 5 TABLET ORAL at 16:18

## 2017-10-23 RX ADMIN — FENTANYL CITRATE 100 MCG: 50 INJECTION, SOLUTION INTRAMUSCULAR; INTRAVENOUS at 02:40

## 2017-10-23 RX ADMIN — SODIUM CHLORIDE, POTASSIUM CHLORIDE, SODIUM LACTATE AND CALCIUM CHLORIDE: 600; 310; 30; 20 INJECTION, SOLUTION INTRAVENOUS at 02:49

## 2017-10-23 RX ADMIN — OXYTOCIN 40 UNITS: 10 INJECTION, SOLUTION INTRAMUSCULAR; INTRAVENOUS at 02:43

## 2017-10-23 RX ADMIN — Medication 5 MG: at 02:07

## 2017-10-23 RX ADMIN — CARBOPROST TROMETHAMINE 250 MCG: 250 INJECTION, SOLUTION INTRAMUSCULAR at 02:36

## 2017-10-23 RX ADMIN — SUFENTANIL CITRATE 5 MCG: 50 INJECTION EPIDURAL; INTRAVENOUS at 01:48

## 2017-10-23 RX ADMIN — CEFAZOLIN SODIUM 2 G: 2 INJECTION, SOLUTION INTRAVENOUS at 12:10

## 2017-10-23 RX ADMIN — SENNOSIDES AND DOCUSATE SODIUM 1 TABLET: 8.6; 5 TABLET ORAL at 08:55

## 2017-10-23 RX ADMIN — KETOROLAC TROMETHAMINE 15 MG: 30 INJECTION, SOLUTION INTRAMUSCULAR; INTRAVENOUS at 22:40

## 2017-10-23 RX ADMIN — SENNOSIDES AND DOCUSATE SODIUM 2 TABLET: 8.6; 5 TABLET ORAL at 20:16

## 2017-10-23 RX ADMIN — OXYTOCIN 40 UNITS: 10 INJECTION, SOLUTION INTRAMUSCULAR; INTRAVENOUS at 02:53

## 2017-10-23 RX ADMIN — OXYCODONE HYDROCHLORIDE 10 MG: 5 TABLET ORAL at 19:41

## 2017-10-23 NOTE — OR NURSING
No counts - Code . Abdominal xray obtained before leaving OR. Dr Aguilar viewed in room. Placenta red bag waste per MD. Cord blood to lab.

## 2017-10-23 NOTE — ANESTHESIA PROCEDURE NOTES
Peripheral nerve/Neuraxial procedure note : intrathecal  Pre-Procedure  Performed by   Referred by [facility]  Location: OB    Procedure Times:10/23/2017 1:40 AM and 10/23/2017 1:53 AM  Pre-Anesthestic Checklist: patient identified, IV checked, site marked, risks and benefits discussed, informed consent, monitors and equipment checked, pre-op evaluation and at physician/surgeon's request    Timeout  Correct Patient: Yes   Correct Procedure: Yes   Correct Site: Yes   Correct Laterality: Yes and N/A   Correct Position: Yes   Site Marked: Yes, N/A   .   Procedure Documentation  ASA 2 and Emergent  Diagnosis:labor pain.    Procedure:    Intrathecal.  Insertion Site:L2-3  (midline approach)      Patient Prep;mask, sterile gloves, chlorhexidine gluconate and isopropyl alcohol, patient draped.  .  Needle: Sourav tip Spinal Needle (gauge): 25  Spinal/LP Needle Length (inches): 3.5 # of attempts: 1 and  # of redirects:  1 Introducer used Introducer: 20 G .       Assessment/Narrative  Paresthesias: No.  .  .  clear CSF fluid removed . Time Injected: 01:47

## 2017-10-23 NOTE — PLAN OF CARE
Report given to Jeanie CABRAL with opportunity to observe the patient face to face.     Dixie Jones RN

## 2017-10-23 NOTE — OR NURSING
Pateint discharged to OB.  Leda score 19. Pain level 3/10.  Discharged from unit via cart.  Hand off report given to MISHA Carpio and MISHA Jones.

## 2017-10-23 NOTE — ANESTHESIA PREPROCEDURE EVALUATION
Anesthesia Evaluation       history and physical reviewed .      No history of anesthetic complications          ROS/MED HX    ENT/Pulmonary:  - neg pulmonary ROS     Neurologic:  - neg neurologic ROS     Cardiovascular:  - neg cardiovascular ROS       METS/Exercise Tolerance:     Hematologic:         Musculoskeletal:         GI/Hepatic:  - neg GI/hepatic ROS       Renal/Genitourinary:         Endo:         Psychiatric:         Infectious Disease:         Malignancy:         Other:                     Physical Exam  Normal systems: cardiovascular, pulmonary and dental    Airway   Mallampati: II  TM distance: > 3 FB  Neck ROM: full  Mouth opening: > 3 cm    Dental     Cardiovascular       Pulmonary     Other findings: Platelets >100      neg OB ROS      obese           Anesthesia Plan      History & Physical Review      ASA Status:  .  OB Epidural Asa: 2       Plan for     Discussed risks and benefits of spinal anesthetic with patient including itching, sore back, infection, hematoma, spinal headache, CV complications, nerve damage, inability to place, conversion to general anesthesia, loss of airway, and death. Pt wishes to proceed.       Postoperative Care      Consents  Anesthetic plan, risks, benefits and alternatives discussed with:  Patient..                          .

## 2017-10-23 NOTE — ANESTHESIA PREPROCEDURE EVALUATION
Anesthesia Evaluation       history and physical reviewed .      No history of anesthetic complications          ROS/MED HX    ENT/Pulmonary:  - neg pulmonary ROS     Neurologic:       Cardiovascular:  - neg cardiovascular ROS       METS/Exercise Tolerance:     Hematologic:         Musculoskeletal:         GI/Hepatic:     (+) GERD       Renal/Genitourinary:         Endo:         Psychiatric:         Infectious Disease:         Malignancy:         Other:                     Physical Exam  Normal systems: cardiovascular, pulmonary and dental    Airway   Mallampati: II  TM distance: > 3 FB  Neck ROM: full  Mouth opening: > 3 cm    Dental     Cardiovascular       Pulmonary           neg OB ROS                 Anesthesia Plan      History & Physical Review      ASA Status:  .  OB Epidural Asa: 2 and emergent            Postoperative Care      Consents  Anesthetic plan, risks, benefits and alternatives discussed with:  Patient..                          .

## 2017-10-23 NOTE — PROGRESS NOTES
[facility] - Respiratory Clinical Assessment    Current Patient History:    Respiratory History: nonsmoker    Smoking History: none    Oxygen dependency: No,    Oxygen prescribed: none    10/23/2017 7:38 AM Patient Initial Assessment:     Level of Consciousness: alert , cooperative    Skin color: pink    Lung sounds:cl    Respirations:  Normal with easy respirations and no use of accessory muscles to breathe    Respiratory symptoms: no other unusual symptoms    Cough/Sputum:  dry  Current oxygenation status: room air

## 2017-10-23 NOTE — PLAN OF CARE
Problem: Patient Care Overview  Goal: Plan of Care/Patient Progress Review  Outcome: Improving    10/23/17 0914   OTHER   Plan Of Care Reviewed With patient   Plan of Care Review   Progress improving         Assessments as charted. B/P: 106/58, T: 98.5, P: 100, R: 16. Rates pain: 5/10 at incision, PRN medication utilized. Incision: without signs of infection and no drainage. Voiding without difficulty. Fundus firm, midline. Lochia: Light. Activity: encouraged post-op, education on early ambulation and fall prevention given and reinforced. Infant feeding: Breast feeding going well.      LATCH Score:   Latch: 1 - Repeated Attempts  Audible Swallowin - Spontaneous & frequent  Type of Nipple: (Breast/Nipple) 1 - Flat  Comfort: 2 - Soft, Nontender  Hold: 1 - Min. Assist   Total LATCH Score: 7     Postpartum breastfeeding assessment completed and education provided, see Patient Education Activity.  Items included in the education are:     proper positioning and latch    effectiveness of feeding    manual expression    handling and storing breastmilk    maintenance of breastfeeding for the first 6 months    sign/symptoms of infant feeding issues requiring referral to qualified health care provider  Postpartum care education provided, see Patient Education activity. Patient denies needs. Will monitor.  Jeanie Paulson        Problem: Postpartum ( Delivery) (Adult,Obstetrics,Pediatric)  Goal: Signs and Symptoms of Listed Potential Problems Will be Absent, Minimized or Managed (Postpartum)  Signs and symptoms of listed potential problems will be absent, minimized or managed by discharge/transition of care (reference Postpartum ( Delivery) (Adult,Obstetrics,Pediatric) [facility]).   Outcome: Improving    10/23/17 0914   Postpartum ( Delivery)   Problems Assessed (Postpartum ) all   Problems Present (Postpartum ) pain

## 2017-10-23 NOTE — PLAN OF CARE
Labor Admission  Jodi Neri  MRN: [id number]  Gestational Age: Unknown      Jodi Neri is admitted for active labor as a direct admission from [facility]. States ctx are every 2-4 minutes rating them 5/10 pain. States she is having spotting bleeding. Denies leaking of fluid.    notified of arrival and condition and Intrapartum orders initiated.     Patient is alert and oriented X 3,Patient oriented to room, unit, hourly rounding, and plan of care.  Call light within reach. Explained admission packet with patient bill of rights brochure. Will continue to monitor and document as needed.     Inpatient nursing criteria listed below was met:    Health care directives status obtained and documented: Yes  Patient identifies a surrogate decision maker: Yes   If yes, who: Significant other Contact Information:See chart  Core Measure diagnosis present:: No  Vaccine assessment done and vaccines ordered if appropriate. No  Clergy visit ordered if patient requests: N/A  Skin issues/needs documented:Yes  Isolation needs addressed, if appropriate: N/A  Fall Prevention (Med and High risk): Care plan updated, Education given and documented and signage used: Yes  Care Plan initiated: Yes  Education Documented (Reminder to educate patient if MRSA is present on admission): Yes  Education Assessment documented:Yes  Patient has discharge needs (If yes, please explain): No

## 2017-10-23 NOTE — BRIEF OP NOTE
[facility] Obstetric  Brief Operative Note    Pre-operative diagnosis: Fetal intolerance of labor    Post-operative diagnosis: Same   Procedure: Low Segment transverse  section.   Surgeon: Kunal Aguilar MD   Assistant(s): None   Anesthesia: General with endotracheal intubation   Estimated blood loss: 700ml   Total IV fluids: (See anesthesia record)   Blood transfusion: No transfusion was given during surgery   Total urine output: (See anesthesia record)   Drains: Torres catheter   Specimens: None   Findings: Male infant   Apgars 7 at 1 min and 8 at 5 minutes   Complications: None   Condition: Stable   Comments: See typed operative report for full details     Staples to skin

## 2017-10-23 NOTE — ANESTHESIA CARE TRANSFER NOTE
Patient: Jodi Neri    Procedure(s):   - Wound Class: II-Clean Contaminated    Diagnosis: * No pre-op diagnosis entered *  Diagnosis Additional Information: No value filed.    Anesthesia Type:   General, RSI     Note:  Airway :Face Mask  Patient transferred to:ICU  ICU Handoff: Call for PAUSE to initiate/utilize ICU HANDOFF, Identified Patient, Identified Responsible Provider, Reviewed the Pertinent Medical History, Discussed Surgical Course, Reviewed Intra-OP Anesthesia Management and Issues during Anesthesia, Set Expectations for Post Procedure Period and Allowed Opportunity for Questions and Acknowledgement of Understanding      Vitals: (Last set prior to Anesthesia Care Transfer)    CRNA VITALS  10/23/2017 0259 - 10/23/2017 0341      10/23/2017             Resp Rate (set): 8                Electronically Signed By: BRIELLE Saha CRNA  October 23, 2017  3:41 AM

## 2017-10-23 NOTE — ANESTHESIA PROCEDURE NOTES
Peripheral nerve/Neuraxial procedure note : epidural catheter  Pre-Procedure  Performed by   Referred by DR. ABBOTT  Location: OB    Procedure Times:10/22/2017 11:50 AM and 10/23/2017 12:31 AM  Pre-Anesthestic Checklist: patient identified, IV checked, risks and benefits discussed, informed consent, monitors and equipment checked, pre-op evaluation, at physician/surgeon's request and post-op pain management    Timeout  Correct Patient: Yes   Correct Procedure: Yes   Correct Site: Yes   Correct Laterality: N/A   Correct Position: Yes   Site Marked: N/A   .   Procedure Documentation    Diagnosis:pregnant.    Procedure:    Epidural catheter.  Insertion Site:L2-3  (midline approach) Injection technique: LORT saline   Local skin infiltrated with 3 mL of 1% lidocaine.  BRANT at 9 cm     Patient Prep;povidone-iodine 7.5% surgical scrub.  .  Needle: Touhy needle Needle Gauge: 18.    Needle Length (Inches) 3.5  # of attempts: 3 and # of redirects:  .   Catheter: 20 G . .  Catheter threaded easily  5 cm epidural space.  14 cm at skin.   .    Assessment/Narrative  Paresthesias: No.  .  .  Aspiration negative for heme or CSF  . Test dose of 3 mL at. Test dose negative for signs of intravascular, subdural or intrathecal injection. Sensory Level Left: T7

## 2017-10-23 NOTE — L&D DELIVERY NOTE
Delivery Summary    Jodi Neri MRN# [id number]   Age: 32 year old YOB: 1985     ASSESSMENT & PLAN:         Delivery/Placenta Date and Time    Delivery Date:  10/23/17 Delivery Time:   2:34 AM      Labor Events and Shoulder Dystocia    Fetal Tracing Prior to Delivery:  Category 3   Fetal Tracing Comments:  Multiple variable decelerations to 60 beats per minute with loss of variability   Code Pink  section Called and done. Patient wheeled to OR rapidly transferred to op. table . Rapid  induction of anesthesia with endotracheal intubation. Rapid Low Segment transverse  section done.   Shoulder dystocia present?:  Neg            Delivery (Maternal) (Provider to Complete) ([id number])          Mother's Information  Mother: Jodi Neri #[id number]    Start of Mother's Information     IO Blood Loss  10/23/17 0232 - 10/23/17 0512    Mom's I/O Activity            End of Mother's Information  Mother: Jodi Neri #[id number]            Delivery - Provider to Complete ([id number])    Delivering clinician:  KUNAL ABBOTT   Attempted Delivery Types (Choose all that apply):  Spontaneous Vaginal Delivery   Delivery Type (Choose the 1 that will go to the Birth History):  , Low Transverse                      Specifics:  Primary multiparius   Indications for Primary:  Other   Other Indications:  multiple variable decelerations to 60 beats per minute with loss of variability         Placenta    Immediate Cord Clamping:  Done   Removal:  Spontaneous   Comments:  cord was thin;    meconium staining of amniotic fluid  mild.   Disposition:  Hospital disposal      Anesthesia    Method:  General, Intrathecal         Presentation and Position    Presentation:  Vertex   Position:  Left Occiput Anterior                Anesthetist :   Marcus Smith   CRNA  Surgeon:         Kunal Abbott FACOG  Baby's weight 7 lb 10 oz    Apgar score: 7 at 1 minute  8 at 5 min   Male infant.    Umbilical cord was skinny and placenta small mild meconium staining of amniotic fluid.    Kunal Aguilar MD

## 2017-10-23 NOTE — H&P
[facility] Labor and Delivery History and Physical    Jodi Neri MRN# [id number]   Age: 32 year old YOB: 1985     Date of Admission:  10/22/2017    Primary care provider: No primary care provider on file.           Chief Complaint:   Jodi Neri is a 32 year old female who is Unknown pregnant and being admitted for active labor management.  Patient was sent here from [facility] with contraction and Amnisure postive          Pregnancy history:     OBSTETRIC HISTORY:       She has one child by   Obstetric History       T0      L0     SAB0   TAB0   Ectopic0   Multiple0   Live Births0       # Outcome Date GA Lbr Trem[place]/2nd Weight Sex Delivery Anes PTL Lv   1 Current                   EDC: Estimated Date of Delivery: None noted.    Prenatal Labs:   Lab Results   Component Value Date    ABO A 10/22/2017    RH Pos 10/22/2017    AS Neg 10/22/2017    HGB 12.4 10/22/2017       GBS Status:   No results found for: GBS    Active Problem List  Patient Active Problem List   Diagnosis     Normal labor      delivery delivered       Medication Prior to Admission  No prescriptions prior to admission.   .        Maternal Past Medical History:   No past medical history on file.                    Family History:   No family history on file.             Social History:     Social History   Substance Use Topics     Smoking status: Not on file     Smokeless tobacco: Not on file     Alcohol use Not on file            Review of Systems:   C: NEGATIVE for fever, chills, change in weight  E/M: NEGATIVE for ear, mouth and throat problems  R: NEGATIVE for significant cough or SOB  CV: NEGATIVE for chest pain, palpitations or peripheral edema          Physical Exam:   Vitals were reviewed  Constitutional:   awake, alert, cooperative, no apparent distress, and appears stated age   Eyes:   Lids and lashes normal, pupils equal, round and reactive to light, extra ocular muscles intact,  sclera clear, conjunctiva normal   ENT:   Normocephalic, without obvious abnormality, atraumatic, sinuses nontender on palpation, external ears without lesions, oral pharynx with moist mucous membranes, tonsils without erythema or exudates, gums normal and good dentition.   Neck:   Supple, symmetrical, trachea midline, no adenopathy, thyroid symmetric, not enlarged and no tenderness, skin normal   Hematologic / Lymphatic:   no cervical lymphadenopathy   Back:   Symmetric, no curvature, spinous processes are non-tender on palpation, paraspinous muscles are non-tender on palpation, no costal vertebral tenderness   Lungs:   No increased work of breathing, good air exchange, clear to auscultation bilaterally, no crackles or wheezing   Cardiovascular:   Normal apical impulse, regular rate and rhythm, normal S1 and S2, no S3 or S4, and no murmur noted   Abdomen:   No scars, normal bowel sounds, soft, non-distended, non-tender, no masses palpated, no hepatosplenomegally   Chest / Breast:   Breasts symmetrical, skin without lesion(s), no nipple retraction or dimpling, no nipple discharge, no masses palpated, no axillary or supraclavicular adenopathy   Genitounirinary:   External Genitalia:  General appearance; normal   Musculoskeletal:   There is no redness, warmth, or swelling of the joints.  Full range of motion noted.  Motor strength is 5 out of 5 all extremities bilaterally.  Tone is normal.   Neurologic:   Awake, alert, oriented to name, place and time.  Cranial nerves II-XII are grossly intact.  Motor is 5 out of 5 bilaterally.  Cerebellar finger to nose, heel to shin intact.  Sensory is intact.  Babinski down going, Romberg negative, and gait is normal.   Neuropsychiatric:   General: normal, calm and normal eye contact   Skin:   no bruising or bleeding        Cervix:   Membranes: intact   Dilation: 5   Effacement: 90%   Station:-1   Consistency: soft   Position: Mid  Presentation:Cephalic  Fetal Heart Rate Tracing:  nonreactive   Variable decelerations to 60 per minute  Repeatedly      IV epinephrine 5 ml given  Tocometer: external monitor                       Assessment:   Jodi Neri is a pregnant female admitted with active labor management.  Cx 5 cm  Multiple variable declerations to 60 beats per minute with poor variability after intrathecal  Iv fluids by pressure bag given   IV epinephrine 5 ml            Plan:   For Code [place] Stat  section.    Kunal Aguilar MD

## 2017-10-23 NOTE — ANESTHESIA POSTPROCEDURE EVALUATION
Patient: Jodi Neri    * No procedures listed *    Diagnosis:* No pre-op diagnosis entered *  Diagnosis Additional Information: No value filed.    Anesthesia Type:  No value filed.    Note:  Anesthesia Post Evaluation    Patient location during evaluation: Bedside  Patient participation: Able to fully participate in evaluation  Level of consciousness: awake and anxious  Pain management: adequate  Airway patency: patent  Cardiovascular status: acceptable  Respiratory status: acceptable  Hydration status: acceptable  PONV: none             Last vitals:  Vitals:    10/22/17 2313   BP: 131/78   Pulse: 68   Resp: 18   Temp: 98.1  F (36.7  C)   SpO2: 95%         Electronically Signed By: BRIELLE Saha CRNA  October 23, 2017  1:15 AM

## 2017-10-23 NOTE — ANESTHESIA POSTPROCEDURE EVALUATION
Patient: Jodi Neri    Procedure(s):  fetal intolerance of labor - Wound Class: II-Clean Contaminated    Diagnosis:* No pre-op diagnosis entered *  Diagnosis Additional Information: No value filed.    Anesthesia Type:  General, RSI    Note:  Anesthesia Post Evaluation    Patient location during evaluation: Bedside  Patient participation: Able to fully participate in evaluation  Level of consciousness: awake and alert  Pain management: satisfactory to patient  Airway patency: patent  Cardiovascular status: acceptable  Respiratory status: acceptable  Hydration status: acceptable  PONV: none             Last vitals:  Vitals:    10/23/17 0500 10/23/17 0520 10/23/17 0840   BP: 137/81 132/76 106/58   Pulse: 103 107 100   Resp: 16 16 16   Temp: 98  F (36.7  C)  98.5  F (36.9  C)   SpO2: 98% 97% 98%         Electronically Signed By: BRIELLE Saha CRNA  October 23, 2017  10:10 AM

## 2017-10-23 NOTE — OP NOTE
[facility]    [facility]   Section Operative Note    Indications: Fetal Intolerance of labor  Gestational age: 39 weeks +    Pre-operative Diagnosis: Fetal intolerance of labor  Post-operative Diagnosis: Same  Surgery Done:  Low segment transverse  section   (Code Pink  section called)  Surgeon:  Dr. Kunal Aguilar  Anesthetist:  Marcus Smith CRNA  Anesthesia:  General with endotracheal intubation.    Indications for surgery:  Multiple variable decelerations to 60 beats per minute with lack of variability.  Cx 5 cm      Procedure Details:  The patient was seen in the Holding Room. The risks, benefits, complications, treatment options, and expected outcomes were discussed with the patient.  The patient concurred with the proposed plan, giving informed consent. Consent form signed  . The patient was taken to Operating Room # 1, identified as Jodi Neri and the procedure verified as  Delivery. A Time Out was held and the above information confirmed.    After induction of anesthesia ie. rapid smooth endotracheal  intubation, the patient was draped and prepped very quickly with splash of betadine to the lower abdomen. A Pfannenstiel incision was made and carried down through the subcutaneous tissue to the fascia. Fascial incision was made and extended transversely. The fascia was  from the underlying rectus tissue superiorly and inferiorly. The rectus muscle was split in the midline.  The peritoneum was identified and entered. Peritoneal incision was extended longitudinally. The utero-vesical peritoneal reflection was incised transversely and the bladder flap was bluntly freed from the lower uterine segment. A low transverse uterine incision was made and the incision enlarged by stretching with the fingers.. Delivered from cephalic presentation was a Male infant   with Apgar scores of 7 at one minute and 9 at five minutes. Incision to delivery time 2  minutes. Incision 2.32 am  Delivery 2.34 am. After the umbilical cord was clamped and cut cord blood was obtained for evaluation. The placenta was removed intact and appeared normal. The uterine outline, tubes and ovaries appeared normal. The uterine incision was closed with running locked sutures of 1 Vicryl in 2 layers . Hemostasis was observed. Lavage was carried out until clear. The peritoneum was repaired with 2 '0' Vicril. The rectus muscle was approximated with 2 '0' Vicril The fascia was then reapproximated with running sutures of 1 Vicryl. The subcutaneous fat was closed with 3 '0' Vicril.  An x-ray of the abdomen was taken and no sponges , towels or  Instruments were seen.   The skin was reapproximated with staples.  Blood loss was 700 ml   No complications  IV ancef 2 grams were given  IV oxytocin  Hemabate given for lax uterus.  Uterus contracted and firmed up.   Hemostasis secured.  Blood drained from vagina.  No complications.  Has Torres catheter.    NO Instrument, sponge, and needles were seen on post op. X RAY of abdomen.     Findings:  Male infant 7 lb 10 oz. Apgar score 7 at one minute and 8 at 5 minutes.  The umbilical cord was quite skinny and placenta was small.   Meconium staining of amniotic fluid mild noted.    Estimated Blood Loss:  700 ml     Total IV Fluids: (See anesthesia record)     Drains: Torres catheter        Specimens: None           Implants: None           Complications:  None           Disposition: To ICU           Condition: Stable    Attending Attestation: Kunal ESCOBEDO, personally performed the above procedure on this patient.

## 2017-10-23 NOTE — ANESTHESIA CARE TRANSFER NOTE
Patient: Jodi Neri    * No procedures listed *    Diagnosis: * No pre-op diagnosis entered *  Diagnosis Additional Information: No value filed.    Anesthesia Type:   No value filed.     Note:  Airway :Room Air  Patient transferred to:Labor and Delivery  Handoff Report: Identifed the Patient, Identified the Reponsible Provider, Reviewed the pertinent medical history, Discussed the surgical course, Reviewed Intra-OP anesthesia mangement and issues during anesthesia, Set expectations for post-procedure period and Allowed opportunity for questions and acknowledgement of understanding      Vitals: (Last set prior to Anesthesia Care Transfer)              Electronically Signed By: BRIELLE Saha CRNA  October 23, 2017  12:40 AM

## 2017-10-23 NOTE — OR NURSING
Pt received from OR via cart.  O2 at 6L via simple mask.  Lungs clear. Pt opens eyes, responds to questions.  Fundus firm, at umbilicus.  Small amount of lochia.  VSS.  Will continue to monitor.

## 2017-10-23 NOTE — ANESTHESIA PREPROCEDURE EVALUATION
Anesthesia Evaluation     .             ROS/MED HX    ENT/Pulmonary:  - neg pulmonary ROS     Neurologic:  - neg neurologic ROS     Cardiovascular:  - neg cardiovascular ROS       METS/Exercise Tolerance:     Hematologic:  - neg hematologic  ROS       Musculoskeletal:  - neg musculoskeletal ROS       GI/Hepatic:     (+) GERD       Renal/Genitourinary:  - ROS Renal section negative       Endo:         Psychiatric:  - neg psychiatric ROS       Infectious Disease:  - neg infectious disease ROS       Malignancy:      - no malignancy   Other:    (+) Possibly pregnant                                   Anesthesia Plan      History & Physical Review  code     ASA Status:  2 .    NPO Status:  Full stomach and unknown    Plan for General and RSI with Intravenous induction. Maintenance will be Balanced.           Postoperative Care      Consents  Anesthetic plan, risks, benefits and alternatives discussed with:  Other (See Comment).  Use of blood products discussed: No .   .                          .

## 2017-10-24 LAB
HGB BLD-MCNC: 8.8 G/DL (ref 11.7–15.7)
T PALLIDUM IGG+IGM SER QL: NEGATIVE

## 2017-10-24 PROCEDURE — 12000027 ZZH R&B OB

## 2017-10-24 PROCEDURE — 25000128 H RX IP 250 OP 636: Performed by: OBSTETRICS & GYNECOLOGY

## 2017-10-24 PROCEDURE — 25000132 ZZH RX MED GY IP 250 OP 250 PS 637: Performed by: OBSTETRICS & GYNECOLOGY

## 2017-10-24 PROCEDURE — 36415 COLL VENOUS BLD VENIPUNCTURE: CPT | Performed by: OBSTETRICS & GYNECOLOGY

## 2017-10-24 PROCEDURE — 85018 HEMOGLOBIN: CPT | Performed by: OBSTETRICS & GYNECOLOGY

## 2017-10-24 RX ORDER — CEPHALEXIN 500 MG/1
500 CAPSULE ORAL EVERY 8 HOURS SCHEDULED
Status: DISCONTINUED | OUTPATIENT
Start: 2017-10-24 | End: 2017-10-25 | Stop reason: HOSPADM

## 2017-10-24 RX ORDER — FERROUS SULFATE 325(65) MG
325 TABLET ORAL 2 TIMES DAILY
Status: DISCONTINUED | OUTPATIENT
Start: 2017-10-24 | End: 2017-10-25 | Stop reason: HOSPADM

## 2017-10-24 RX ADMIN — OXYCODONE HYDROCHLORIDE 10 MG: 5 TABLET ORAL at 01:55

## 2017-10-24 RX ADMIN — IRON 325 MG: 65 TABLET ORAL at 19:47

## 2017-10-24 RX ADMIN — ACETAMINOPHEN 975 MG: 325 TABLET, FILM COATED ORAL at 22:35

## 2017-10-24 RX ADMIN — CEPHALEXIN 500 MG: 500 CAPSULE ORAL at 14:56

## 2017-10-24 RX ADMIN — OXYCODONE HYDROCHLORIDE 10 MG: 5 TABLET ORAL at 11:19

## 2017-10-24 RX ADMIN — SENNOSIDES AND DOCUSATE SODIUM 2 TABLET: 8.6; 5 TABLET ORAL at 08:14

## 2017-10-24 RX ADMIN — OXYCODONE HYDROCHLORIDE 10 MG: 5 TABLET ORAL at 08:14

## 2017-10-24 RX ADMIN — SENNOSIDES AND DOCUSATE SODIUM 2 TABLET: 8.6; 5 TABLET ORAL at 19:47

## 2017-10-24 RX ADMIN — IRON SUCROSE 200 MG: 20 INJECTION, SOLUTION INTRAVENOUS at 08:27

## 2017-10-24 RX ADMIN — CEPHALEXIN 500 MG: 500 CAPSULE ORAL at 22:35

## 2017-10-24 RX ADMIN — OXYCODONE HYDROCHLORIDE 10 MG: 5 TABLET ORAL at 22:36

## 2017-10-24 RX ADMIN — IRON 325 MG: 65 TABLET ORAL at 08:14

## 2017-10-24 RX ADMIN — IBUPROFEN 800 MG: 400 TABLET ORAL at 04:02

## 2017-10-24 RX ADMIN — CEFAZOLIN SODIUM 2 G: 2 INJECTION, SOLUTION INTRAVENOUS at 04:12

## 2017-10-24 RX ADMIN — OXYCODONE HYDROCHLORIDE 10 MG: 5 TABLET ORAL at 04:52

## 2017-10-24 RX ADMIN — ACETAMINOPHEN 975 MG: 325 TABLET, FILM COATED ORAL at 04:01

## 2017-10-24 RX ADMIN — ACETAMINOPHEN 975 MG: 325 TABLET, FILM COATED ORAL at 12:35

## 2017-10-24 RX ADMIN — OXYCODONE HYDROCHLORIDE 10 MG: 5 TABLET ORAL at 14:56

## 2017-10-24 RX ADMIN — OXYCODONE HYDROCHLORIDE 10 MG: 5 TABLET ORAL at 19:46

## 2017-10-24 NOTE — PLAN OF CARE
Reported to Jeanie TAVERAS RN, when entering the patients room, I moticed what I thought smelled like marijuana.  The smell was possibly coming from her company.  Jeanie reported the incident to the .

## 2017-10-24 NOTE — PHARMACY
[facility]    Pharmacy      Antimicrobial Stewardship Note     Current antimicrobial therapy:  Anti-infectives (Future)    Start     Dose/Rate Route Frequency Ordered Stop    10/24/17 1400  cephALEXin (KEFLEX) capsule 500 mg      500 mg Oral EVERY 8 HOURS SCHEDULED 10/24/17 0817            Indication: Possible post-op infection, code pink  section     Pertinent labs:  Creatinine No results found for: CR  WBC   WBC   Date Value Ref Range Status   10/22/2017 12.9 (H) 4.0 - 11.0 10e9/L Final     ANC No components found for: ANC     Culture Results: none     Recommendations/Interventions:  1. None.  Patient already received 2 post-op IV antibiotics. Monitor for infection.      Margaux Whitehead [facility]  2017

## 2017-10-24 NOTE — PLAN OF CARE
Education given and assistance provided in nursing baby. Talked about the importance of putting baby to the breast more frequently to promote an adequate milk supply.   Baby nursed on both breasts for about 30 minutes total.

## 2017-10-24 NOTE — PROGRESS NOTES
[facility]   Obstetrics Post-Op / Progress Note           Assessment and Plan:    Assessment:   Post-operative day #1  Low transverse primary  section  L&D complications: Fetal intolerance of labor      Doing well.    Hb 8.8      Plan:   Anticipate discharge tomorrow  Start iv iron sucrose today    And  FeSO4   Bid   Stop iv ancef  Start cephalexin 500 mg tid po.            Interval History:   Doing well.  Pain is well-controlled.  No fevers.  No history of wound drainage, warmth or significant erythema.  Good appetite.  Denies chest pain, shortness of breath, nausea or vomiting.  Ambulatory.  Breastfeeding well.            Significant Problems:    Anemia from blood loss post surgery.          Review of Systems:    The patient denies any chest pain, shortness of breath, excessive pain, fever, chills, purulent drainage from the wound, nausea or vomiting.          Medications:     All medications related to the patient's surgery have been reviewed  Current Facility-Administered Medications   Medication     iron sucrose (VENOFER) injection 200 mg     ferrous sulfate (IRON) tablet 325 mg     medication instruction     lactated ringers BOLUS 250 mL     ePHEDrine injection 5 mg     nalbuphine (NUBAIN) injection 2.5-5 mg     medication instruction     Opioid plan postpartum - medication instruction     ropivacaine (NAROPIN) injection 8 mL     fentaNYL (SUBLIMAZE) 2 mcg/mL, ropivacaine (NAROPIN) 0.2% in NaCl 0.9% EPIDURAL infusion     ondansetron (ZOFRAN-ODT) ODT tab 4 mg     lidocaine 1 % 1 mL     lidocaine (LMX4) kit     sodium chloride (PF) 0.9% PF flush 3 mL     sodium chloride (PF) 0.9% PF flush 3 mL     oxytocin (PITOCIN) 30 units in 500 mL 0.9% NaCl infusion     dextrose 5% in lactated ringers infusion     naloxone (NARCAN) injection 0.1-0.4 mg     senna-docusate (SENOKOT-S;PERICOLACE) 8.6-50 MG per tablet 1-2 tablet     [START ON 10/25/2017] bisacodyl (DULCOLAX) Suppository 10 mg      "[START ON 10/25/2017] sodium phosphate (FLEET ENEMA) 1 enema     ondansetron (ZOFRAN) injection 4 mg     hydrocortisone 2.5 % cream     diphenhydrAMINE (BENADRYL) capsule 25 mg    Or     diphenhydrAMINE (BENADRYL) injection 25 mg     lanolin ointment     simethicone (MYLICON) chewable tablet 80 mg     oxytocin (PITOCIN) 30 units in 500 mL 0.9% NaCl infusion     oxytocin (PITOCIN) injection 10 Units     misoprostol (CYTOTEC) tablet 400 mcg     NO Rho (D) immune globulin (RhoGam) needed - mother Rh POSITIVE     acetaminophen (TYLENOL) tablet 975 mg     [START ON 10/26/2017] acetaminophen (TYLENOL) tablet 650 mg     oxyCODONE (ROXICODONE) IR tablet 5-10 mg     measles, mumps and rubella vaccine (MMR) injection 0.5 mL     Tdap (tetanus-diphtheria-acell pertussis) (ADACEL) injection 0.5 mL     ibuprofen (ADVIL/MOTRIN) tablet 400-800 mg     HYDROmorphone (PF) (DILAUDID) injection 0.3-0.5 mg     ceFAZolin sodium-dextrose (ANCEF) infusion 2 g     lactated ringers infusion     oxytocin (PITOCIN) 30 units in 500 mL 0.9% NaCl infusion     Medication Instructions: misoprostol (CYTOTEC)- Nurse to discuss ordering with provider, if needed. Ordered via \"OB misoprostol (CYTOTEC) Postpartum Hemorrhage PANEL\"     methylergonovine (METHERGINE) injection 200 mcg     carboprost (HEMABATE) injection 250 mcg     lidocaine 1 % 0.1-20 mL     ibuprofen (ADVIL/MOTRIN) tablet 800 mg     oxyCODONE-acetaminophen (PERCOCET) 5-325 MG per tablet 1 tablet     nitrous oxide/oxygen 50/50 blend     fentaNYL (PF) (SUBLIMAZE) injection  mcg     Facility-Administered Medications Ordered in Other Encounters   Medication     bupivacaine (MARCAINE) 0.25 % injection     SUFentanil (SUFENTA) injection             Physical Exam:   All vitals stable  Temp: 98.4  F (36.9  C) Temp src: Oral BP: 129/78 Pulse: 98 Heart Rate: 98 Resp: 16 SpO2: 97 %        Wound clean and dry with minimal or no drainage.  Surrounding skin with minimal erythema.   Lungs clear     "   Abdomen soft   Bowel sounds are active       Data:     All laboratory data related to this surgery reviewed  Hemoglobin   Date Value Ref [facility] Status   10/24/2017 8.8 (L) 11.7 - 15.7 g/dL Final   10/22/2017 12.4 11.7 - 15.7 g/dL Final     All imaging studies related to this surgery reviewed  I personally reviewed these imaging films.  A formal report from radiology will follow.Kunal Aguilar MD

## 2017-10-24 NOTE — PLAN OF CARE
Problem: Postpartum ( Delivery) (Adult,Obstetrics,Pediatric)  Goal: Signs and Symptoms of Listed Potential Problems Will be Absent, Minimized or Managed (Postpartum)  Signs and symptoms of listed potential problems will be absent, minimized or managed by discharge/transition of care (reference Postpartum ( Delivery) (Adult,Obstetrics,Pediatric) CPG).   Assessments as charted. B/P: 105/57, T: 98, P: 113, R: 18. Rates pain: 4/10 Taking pain medication every three hours as directed. Incision: remains covered by clean, dry, intact dressing.  Voiding without difficulty. Fundus firm at umbilicus. Lochia: Light. Activity: normal activity, up ad surekha, ambulates with minor incisional discomfort. Infant feeding: Breast feeding going not well infant sleepy at breast. Skin to skin encouraged.           LATCH Score:   Latch: 1 - Repeated Attempts  Audible Swallowin - Few  Type of Nipple: (Breast/Nipple) 1 - Flat  Comfort: 2 - Soft, Nontender  Hold: 1 - Min. Assist   Total LATCH Score:      Postpartum breastfeeding assessment completed and education provided, see Patient Education Activity.  Items included in the education are:     proper positioning and latch    effectiveness of feeding    manual expression    handling and storing breastmilk    maintenance of breastfeeding for the first 6 months    sign/symptoms of infant feeding issues requiring referral to qualified health care provider  Postpartum care education provided, see Patient Education activity. Patient denies needs. Will monitor.  Charley Alanis

## 2017-10-24 NOTE — PLAN OF CARE
Face to face report given with opportunity to observe patient.    Report given to Charley Paulson   10/23/2017  7:20 PM

## 2017-10-24 NOTE — PLAN OF CARE
Pt education given on breastfeeding. Pt encouraged to breastfeed. Pt defensive and stating she is eating her breakfast and that staff is preventing her from nursing because we are doing other things. Writer has encouraged and educated on breastfeeding frequency and important multiple times this shift. Writer discusses importance of waking baby to feed or the need for hand expression and spoon feedings to be initiated.

## 2017-10-24 NOTE — PLAN OF CARE
Notified Patient Care Supervisor after I entered patients room and smelt an odor of marijuana near patient's visitors. Told supervisor my plan to monitor and reassess the room and situation after visitors leave to see if the odor is still present.

## 2017-10-25 VITALS
SYSTOLIC BLOOD PRESSURE: 122 MMHG | RESPIRATION RATE: 16 BRPM | OXYGEN SATURATION: 96 % | TEMPERATURE: 98.3 F | HEART RATE: 98 BPM | DIASTOLIC BLOOD PRESSURE: 62 MMHG

## 2017-10-25 PROCEDURE — 25000132 ZZH RX MED GY IP 250 OP 250 PS 637: Performed by: OBSTETRICS & GYNECOLOGY

## 2017-10-25 RX ORDER — LANOLIN 100 %
OINTMENT (GRAM) TOPICAL
Qty: 40 G | Refills: 3 | Status: SHIPPED | OUTPATIENT
Start: 2017-10-25

## 2017-10-25 RX ORDER — AMOXICILLIN 250 MG
1-2 CAPSULE ORAL 2 TIMES DAILY
Qty: 60 TABLET | Refills: 1 | Status: SHIPPED | OUTPATIENT
Start: 2017-10-25

## 2017-10-25 RX ORDER — IBUPROFEN 400 MG/1
800 TABLET, FILM COATED ORAL EVERY 6 HOURS PRN
Qty: 100 TABLET | Refills: 2 | Status: SHIPPED | OUTPATIENT
Start: 2017-10-25

## 2017-10-25 RX ORDER — OXYCODONE HYDROCHLORIDE 5 MG/1
5 TABLET ORAL
Qty: 36 TABLET | Refills: 0 | Status: SHIPPED | OUTPATIENT
Start: 2017-10-25

## 2017-10-25 RX ORDER — CEPHALEXIN 500 MG/1
500 CAPSULE ORAL 3 TIMES DAILY
Qty: 18 CAPSULE | Refills: 0 | Status: SHIPPED | OUTPATIENT
Start: 2017-10-25 | End: 2017-10-31

## 2017-10-25 RX ORDER — FERROUS SULFATE 325(65) MG
325 TABLET ORAL 2 TIMES DAILY
Qty: 100 TABLET | Refills: 1 | Status: SHIPPED | OUTPATIENT
Start: 2017-10-25

## 2017-10-25 RX ADMIN — ACETAMINOPHEN 975 MG: 325 TABLET, FILM COATED ORAL at 14:20

## 2017-10-25 RX ADMIN — OXYCODONE HYDROCHLORIDE 10 MG: 5 TABLET ORAL at 06:09

## 2017-10-25 RX ADMIN — ACETAMINOPHEN 975 MG: 325 TABLET, FILM COATED ORAL at 06:09

## 2017-10-25 RX ADMIN — IBUPROFEN 800 MG: 400 TABLET ORAL at 08:08

## 2017-10-25 RX ADMIN — IRON 325 MG: 65 TABLET ORAL at 09:37

## 2017-10-25 RX ADMIN — CEPHALEXIN 500 MG: 500 CAPSULE ORAL at 06:09

## 2017-10-25 RX ADMIN — SENNOSIDES AND DOCUSATE SODIUM 1 TABLET: 8.6; 5 TABLET ORAL at 09:37

## 2017-10-25 RX ADMIN — OXYCODONE HYDROCHLORIDE 10 MG: 5 TABLET ORAL at 14:19

## 2017-10-25 RX ADMIN — IBUPROFEN 800 MG: 400 TABLET ORAL at 01:49

## 2017-10-25 RX ADMIN — OXYCODONE HYDROCHLORIDE 10 MG: 5 TABLET ORAL at 01:49

## 2017-10-25 RX ADMIN — CEPHALEXIN 500 MG: 500 CAPSULE ORAL at 14:20

## 2017-10-25 NOTE — PLAN OF CARE
Spoke with client r/t visitors smelling like Marijuana. Client stated she smelt this as well and that these where not her friends but they are the S/O friends. States that the S/O is still using mrijuana, failed a u/a and has 2 weeks to give another sample that is clean r/t social service involvement [place]. Client was forthcoming with information r.t S/O not being able to stay in same home as her or baby if u/a comes back positive r/t client hx of drug use. Client denies s/o is using anything other then marijuana and that his actions that have been seen on the floor ( pacing, in and out of facility, not sleeping, fighting with client, agitation with staff) is due to stress and money issues.

## 2017-10-25 NOTE — PROGRESS NOTES
Talked with Jodi who was nursing baby, otherwise alone in her room. The FOB is Óscar Brenner who is her SO. Jodi also has a 4 year old whom she does not have custody, he lives with her cousin. Joid lives in [place] with her SO and now new baby. She does not drive and uses public transportation or gets rides with friends. Feels she is well supported by family and friends.     Her PCP Dr Garcia will also see baby. She also see's Dr Don for OB. Says f/u appointments have been made and that she will be able to get to them via friends/family rides. She see's a mental health provider twice/month for general mental health support, cites the loss of her 4 year old as the cause. She feels working with the counselor has been helpful and she plans to continue to do so. She works with Allison Jaquez (who she believes is a ) with [facility] [employer]. She acknowledged having a past CPS case regarding her now 4 year old taken from her for substance use concerns. She was provided a medical release form which she requested in order to have lab shared with [facility].     She and her SO have applied for [facility] and [employer]'s energy assistance program. She routinely works with Allison Treat with needs she has. She says a friend will transport her home today, she has a car seat in the room.     She says she does not smoke, drink alcohol, nor use street drugs at this time. 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 Says that since she has been sober she uses an AA sponsor and attends meetings; has also been through an inpatient treatment program in the past.     In talking about the argument that was overheard earlier she says she and Óscar were arguing over the need for money for basic needs now that she is not working. She denies that there has ever been physical abuse nor that she worries about future  physical abuse. However she acknowledges having a plan to go to either a friends house or his sisters house if she needs to leave the home at any point in the future.     Spoke to [facility]  supervisor who says they have an open CPS case with her because she has already had one child taken and because of the history of concerns they have had with Óscar. They were updated on the argument that occurred here. They were updated on her discharge plan today and they will be contacting her in the next day or two. She says it is ok for Jodi to be discharged with her baby today.

## 2017-10-25 NOTE — PROGRESS NOTES
[facility]   Obstetrics Post-Op / Progress Note           Assessment and Plan:    Assessment:   Post-operative day #2  Low transverse primary  section  L&D complications: Fetal intolerance of labor      Doing well.      Plan:   Discharge later today            Interval History:   Doing well.  Pain is well-controlled.  No fevers.  No history of wound drainage, warmth or significant erythema.  Good appetite.  Denies chest pain, shortness of breath, nausea or vomiting.  Ambulatory.  Breastfeeding well.            Significant Problems:    Anemia from blood loss          Review of Systems:    The patient denies any chest pain, shortness of breath, excessive pain, fever, chills, purulent drainage from the wound, nausea or vomiting.          Medications:     All medications related to the patient's surgery have been reviewed  Current Facility-Administered Medications   Medication     ferrous sulfate (IRON) tablet 325 mg     cephALEXin (KEFLEX) capsule 500 mg     medication instruction     lactated ringers BOLUS 250 mL     ePHEDrine injection 5 mg     nalbuphine (NUBAIN) injection 2.5-5 mg     medication instruction     Opioid plan postpartum - medication instruction     ropivacaine (NAROPIN) injection 8 mL     fentaNYL (SUBLIMAZE) 2 mcg/mL, ropivacaine (NAROPIN) 0.2% in NaCl 0.9% EPIDURAL infusion     ondansetron (ZOFRAN-ODT) ODT tab 4 mg     lidocaine 1 % 1 mL     lidocaine (LMX4) kit     sodium chloride (PF) 0.9% PF flush 3 mL     sodium chloride (PF) 0.9% PF flush 3 mL     oxytocin (PITOCIN) 30 units in 500 mL 0.9% NaCl infusion     dextrose 5% in lactated ringers infusion     naloxone (NARCAN) injection 0.1-0.4 mg     senna-docusate (SENOKOT-S;PERICOLACE) 8.6-50 MG per tablet 1-2 tablet     bisacodyl (DULCOLAX) Suppository 10 mg     sodium phosphate (FLEET ENEMA) 1 enema     ondansetron (ZOFRAN) injection 4 mg     hydrocortisone 2.5 % cream     diphenhydrAMINE (BENADRYL) capsule 25 mg     "Or     diphenhydrAMINE (BENADRYL) injection 25 mg     lanolin ointment     simethicone (MYLICON) chewable tablet 80 mg     oxytocin (PITOCIN) 30 units in 500 mL 0.9% NaCl infusion     oxytocin (PITOCIN) injection 10 Units     misoprostol (CYTOTEC) tablet 400 mcg     NO Rho (D) immune globulin (RhoGam) needed - mother Rh POSITIVE     acetaminophen (TYLENOL) tablet 975 mg     [START ON 10/26/2017] acetaminophen (TYLENOL) tablet 650 mg     oxyCODONE (ROXICODONE) IR tablet 5-10 mg     measles, mumps and rubella vaccine (MMR) injection 0.5 mL     Tdap (tetanus-diphtheria-acell pertussis) (ADACEL) injection 0.5 mL     ibuprofen (ADVIL/MOTRIN) tablet 400-800 mg     HYDROmorphone (PF) (DILAUDID) injection 0.3-0.5 mg     lactated ringers infusion     oxytocin (PITOCIN) 30 units in 500 mL 0.9% NaCl infusion     Medication Instructions: misoprostol (CYTOTEC)- Nurse to discuss ordering with provider, if needed. Ordered via \"OB misoprostol (CYTOTEC) Postpartum Hemorrhage PANEL\"     methylergonovine (METHERGINE) injection 200 mcg     carboprost (HEMABATE) injection 250 mcg     lidocaine 1 % 0.1-20 mL     ibuprofen (ADVIL/MOTRIN) tablet 800 mg     oxyCODONE-acetaminophen (PERCOCET) 5-325 MG per tablet 1 tablet     nitrous oxide/oxygen 50/50 blend     fentaNYL (PF) (SUBLIMAZE) injection  mcg     Facility-Administered Medications Ordered in Other Encounters   Medication     bupivacaine (MARCAINE) 0.25 % injection     SUFentanil (SUFENTA) injection             Physical Exam:   All vitals stable  Temp: 98.3  F (36.8  C) Temp src: Oral BP: 122/62   Heart Rate: 118 Resp: 16 SpO2: 96 %        Wound clean and dry with minimal or no drainage.  Surrounding skin with minimal erythema.         Data:     All laboratory data related to this surgery reviewed  Hemoglobin   Date Value Ref Range Status   10/24/2017 8.8 (L) 11.7 - 15.7 g/dL Final   10/22/2017 12.4 11.7 - 15.7 g/dL Final     All imaging studies related to this surgery " Sal Aguilar MD   Doing well  Discharge later today as patient anxious to go home to [place]  Libertad may be removed in [place] next week Monday and Post partum follow up may  Be done there as well  See me here if having problems with the surgery.

## 2017-10-25 NOTE — PLAN OF CARE
Problem: Postpartum ( Delivery) (Adult,Obstetrics,Pediatric)  Goal: Signs and Symptoms of Listed Potential Problems Will be Absent, Minimized or Managed (Postpartum)  Signs and symptoms of listed potential problems will be absent, minimized or managed by discharge/transition of care (reference Postpartum ( Delivery) (Adult,Obstetrics,Pediatric) CPG).   Outcome: Improving    10/25/17 0811   Postpartum ( Delivery)   Problems Assessed (Postpartum ) all   Problems Present (Postpartum ) none      Assessments as charted. B/P: 122/62, T: 98.3, P: 98, R: 16. Rates pain: 4/10 incisional pain, see eMar for medication administration. Incision: Healing well, well approximated, without signs of infection and no drainage. Voiding without difficulty. Fundus firm U/2 midline. Lochia: Light. Activity: normal activity. Infant feeding: Breast feeding going well.      LATCH Score:   Latch: 2 - Good Latch  Audible Swallowin - Spontaneous & frequent  Type of Nipple: (Breast/Nipple) 2 - Everted  Comfort: 1 - Filling, small blisters, mild/mod pain  Hold: 2 - No Assist   Total LATCH Score: 9     Postpartum breastfeeding assessment completed and education provided, see Patient Education Activity.  Items included in the education are:     proper positioning and latch    effectiveness of feeding    manual expression    handling and storing breastmilk    maintenance of breastfeeding for the first 6 months    sign/symptoms of infant feeding issues requiring referral to qualified health care provider  Postpartum care education provided, see Patient Education activity. Patient denies needs. Will monitor.  Anna Proctor

## 2017-10-25 NOTE — ANESTHESIA POSTPROCEDURE EVALUATION
Patient: Jodi Neri    * No procedures listed *    Diagnosis:* No pre-op diagnosis entered *  Diagnosis Additional Information: No value filed.    Anesthesia Type:  No value filed.    Note:  Anesthesia Post Evaluation    Patient location during evaluation: Floor  Patient participation: Able to fully participate in evaluation  Level of consciousness: awake and alert  Pain management: adequate  Airway patency: patent  Cardiovascular status: acceptable  Respiratory status: acceptable  Hydration status: stable  PONV: none     Anesthetic complications: None          Last vitals:  Vitals:    10/24/17 0736 10/24/17 1600 10/24/17 2332   BP: 129/78  134/74   Pulse: 98     Resp: 16 16 16   Temp: 98.4  F (36.9  C) 98.3  F (36.8  C) 97.8  F (36.6  C)   SpO2: 97%  98%         Electronically Signed By: Yifan Marshall MD  October 25, 2017  6:40 AM

## 2017-10-25 NOTE — PLAN OF CARE
Problem: Patient Care Overview  Goal: Plan of Care/Patient Progress Review  Outcome: Improving    10/25/17 043   OTHER   Plan Of Care Reviewed With patient   Plan of Care Review   Progress improving   Assessments as charted. B/P: 134/74, T: 97.8, P: 98, R: 16. Rates pain: 3/10 PRN as requested. Incision: Healing well and well approximated. Voiding without difficulty. Fundus firm u/2. Lochia: Light. Activity: moving with difficulty due to incision pain. Infant feeding: Breast feeding going well.      LATCH Score:   Latch: 2 - Good Latch  Audible Swallowin - Spontaneous & frequent  Type of Nipple: (Breast/Nipple) 1- flat  Comfort: 2 - Soft, Nontender  Hold: 2 - No Assist   Total LATCH Score: 9     Postpartum breastfeeding assessment completed and education provided, see Patient Education Activity.  Items included in the education are:     proper positioning and latch    effectiveness of feeding    manual expression    handling and storing breastmilk    maintenance of breastfeeding for the first 6 months    sign/symptoms of infant feeding issues requiring referral to qualified health care provider  Postpartum care education provided, see Patient Education activity. Patient denies needs. Will monitor.  Bianca Samano        Problem: Postpartum ( Delivery) (Adult,Obstetrics,Pediatric)  Goal: Signs and Symptoms of Listed Potential Problems Will be Absent, Minimized or Managed (Postpartum)  Signs and symptoms of listed potential problems will be absent, minimized or managed by discharge/transition of care (reference Postpartum ( Delivery) (Adult,Obstetrics,Pediatric) CPG).   Outcome: Improving    10/25/17 0434   Postpartum ( Delivery)   Problems Assessed (Postpartum ) all   Problems Present (Postpartum ) none         Face to face report given with opportunity to observe patient.    Report given to MISHA Zapien   10/25/2017  7:16 AM

## 2017-10-25 NOTE — PLAN OF CARE
Patient discharged at 4:35 PM via ambulation accompanied by other: Friend and staff. Prescriptions sent to patients preferred pharmacy. All belongings sent with patient.     Discharge instructions reviewed with patient. Listed belongings gathered and returned to patient. yes    Patient discharged to home.     Core Measures and Vaccines  Core Measures applicable during stay: No  Pneumonia and Influenza given prior to discharge, if indicated: N/A    Surgical Patient   Surgical Procedures during stay: Yes  Did patient receive discharge instruction on wound care and recognition of infection symptoms? Yes    MISC  Follow up appointment made:  Yes  Home and hospital aquired medications returned to patient: N/A  Patient reports pain was well managed at discharge: Yes

## 2017-10-25 NOTE — PHARMACY
[facility]    Pharmacy      Antimicrobial Stewardship Note     Current antimicrobial therapy:  Anti-infectives (Future)    Start     Dose/Rate Route Frequency Ordered Stop    10/25/17 0000  cephALEXin (KEFLEX) 500 MG capsule      500 mg Oral 3 TIMES DAILY 10/25/17 0904 10/31/17 2359    10/24/17 1400  cephALEXin (KEFLEX) capsule 500 mg      500 mg Oral EVERY 8 HOURS SCHEDULED 10/24/17 0817          Indication: possible post-op infection, code pink  section     Pertinent labs:  Creatinine No results found for: CR  WBC   WBC   Date Value Ref Range Status   10/22/2017 12.9 (H) 4.0 - 11.0 10e9/L Final     ANC No components found for: ANC     Culture Results: none     Recommendations/Interventions:  1. None at this time     Jocelyn Koehler RPH  2017

## 2017-10-25 NOTE — DISCHARGE INSTRUCTIONS
Follow up appointment with Dr. Crespo on 2017 at 11:20 AM.    Postop  Birth Instructions    Activity       Do not lift more than 10 pounds for 6 weeks after surgery.  Ask family and friends for help when you need it.    No driving until you have stopped taking your pain medications (usually two weeks after surgery).    No heavy exercise or activity for 6 weeks.  Don't do anything that will put a strain on your surgery site.    Don't strain when using the toilet.  Your care team may prescribe a stool softener if you have problems with your bowel movements.     To care for your incision:       Keep the incision clean and dry.    Do not soak your incision in water. No swimming or hot tubs until it has fully healed. You may soak in the bathtub if the water level is below your incision.    Do not use peroxide, gel, cream, lotion, or ointment on your incision.    Adjust your clothes to avoid pressure on your surgery site (check the elastic in your underwear for example).     You may see a small amount of clear or pink drainage and this is normal.  Check with your health care provider:       If the drainage increases or has an odor.    If the incision reddens, you have swelling, or develop a rash.    If you have increased pain and the medicine we prescribed doesn't help.    If you have a fever above 100.4 F (38 C) with or without chills when placing thermometer under your tongue.   The area around your incision (surgery wound), will feel numb.  This is normal. The numbness should go away in less than a year.     Keep your hands clean:  Always wash your hands before touching your incision (surgery wound). This helps reduce your risk of infection. If your hands aren't dirty, you may use an alcohol hand-rub to clean your hands. Keep your nails clean and short.    Call your healthcare provider if you have any of these symptoms:       You soak a sanitary pad with blood within 1 hour, or you see  blood clots larger than a golf ball.    Bleeding that lasts more than 6 weeks.    Vaginal discharge that smells bad.    Severe pain, cramping or tenderness in your lower belly area.    A need to urinate more frequently (use the toilet more often), more urgently (use the toilet very quickly), or it burns when you urinate.    Nausea and vomiting.    Redness, swelling or pain around a vein in your leg.    Problems breastfeeding or a red or painful area on your breast.    Chest pain and cough or are gasping for air.    Problems with coping with sadness, anxiety or depression. If you have concerns about hurting yourself or the baby, call your provider immediately.      You have questions or concerns after you return home.

## 2017-10-30 NOTE — DISCHARGE SUMMARY
[facility] Discharge Summary    Jodi Neri MRN# [id number]   Age: 32 year old YOB: 1985     Date of Admission:  10/22/2017  Date of Discharge::  10/25/2017  4:35 PM  Admitting Physician:  Kunal Aguilar MD  Discharge Physician:  Kunal Aguilar MD     Home clinic: [facility]          Admission Diagnoses:   Normal labor  Fetal intolerance of labor   delivery delivered          Discharge Diagnosis:    section done for fetal intolerance of labor  Anemia from acute blood loss associated with surgery          Procedures:   Low segment transverse  section        No other procedures performed during this admission           Medications Prior to Admission:     No prescriptions prior to admission.             Discharge Medications:     Discharge Medication List as of 10/25/2017  3:45 PM      START taking these medications    Details   oxyCODONE (ROXICODONE) 5 MG IR tablet Take 1 tablet (5 mg) by mouth every 3 hours as needed for moderate to severe pain, Disp-36 tablet, R-0, Local Print      ibuprofen (ADVIL/MOTRIN) 400 MG tablet Take 2 tablets (800 mg) by mouth every 6 hours as needed for other (cramping), Disp-100 tablet, R-2, E-Prescribe      cephALEXin (KEFLEX) 500 MG capsule Take 1 capsule (500 mg) by mouth 3 times daily for 6 days, Disp-18 capsule, R-0, E-Prescribe      ferrous sulfate (IRON) 325 (65 FE) MG tablet Take 1 tablet (325 mg) by mouth 2 times daily, Disp-100 tablet, R-1, E-Prescribe      lanolin ointment Apply topically every hour as needed for dry skin (soreness)Disp-40 g, [id number]      senna-docusate (SENOKOT-S;PERICOLACE) 8.6-50 MG per tablet Take 1-2 tablets by mouth 2 times daily, Disp-60 tablet, R-1, E-Prescribe         CONTINUE these medications which have NOT CHANGED    Details   fluticasone (FLONASE) 50 MCG/ACT spray Spray 50 sprays into both nostrils daily, Historical      loratadine (CLARITIN) 10 MG tablet Take 10 mg by mouth  daily as needed, Historical      Prenatal Vit-Fe Fumarate-FA (PRENATAL MULTIVITAMIN PLUS IRON) 27-0.8 MG TABS per tablet Take 1 tablet by mouth daily, Historical      prochlorperazine (COMPAZINE) 10 MG tablet Take 5 mg by mouth daily as needed, Historical                   Consultations:   No consultations were requested during this admission          Brief History of Labor or Admission:   Admitted in labor transfer from [place]. Had intrathecal for labor analgesia.  Had repeated variable decelerations to 60 beats per minute with loss of variability.  Code Pink  section done.  Anesthesia : rapid endotracheal intubation - general anesthetic  Anesthetist : Marcus Smith   Surgeon: Dr. Kunal Aguilar.  Incision time to delivery  2 minutes.  Live Male infant Apgars 7 at one minute  8 at 5 minutes. Umbilical cord noted to be small and   Placenta also small. Light meconium staining of amniotic fluid. Hemabate given for uterine atony  Including intravenous oxytocin.  Myometrium repaired and hemostasis established  Abdomen closed in layers  X Ray taken   No sponges or instruments seen in Abdomen or Pelvis  Libertad placed for skin Pfannenstiel incision  No complications.             Hospital Course:   The patient's hospital course was unremarkable.  She recovered as anticipated and experienced no post-operative complications.  On discharge, her pain was well controlled.  Vaginal bleeding is similar to peak menstrual flow.  Voiding without difficulty.  Ambulating well and tolerating a normal diet.  No fever or significant wound drainage.  Breastfeeding well.  Infant is stable.   She was discharged on post-partum day #2      Post-partum hemoglobin:   Hemoglobin   Date Value Ref Range Status   10/24/2017 8.8 (L) 11.7 - 15.7 g/dL Final             Discharge Instructions and Follow-Up:   Discharge diet: Regular   Discharge activity: Activity as tolerated   Avoid sex 6 weeks   Discharge follow-up: Follow up  with  At [place] in 1 week from surgery for removal of staples and application of steristrips.   6 weeks post partum at [place]        Wound care: Keep wound clean and dry.  Wound may be washed with water and dried           Discharge Disposition:   Discharged to home      Attestation:  I have reviewed today's vital signs, notes, medications, labs and imaging.    Kunal Aguilar MD

## 2017-10-30 NOTE — PROGRESS NOTES
Patient had anemia from blood loss due to surgery  Diagnosis:  Anemia from blood loss due to surgery    Please code as such.

## 2018-01-25 ENCOUNTER — DOCUMENTATION ONLY (OUTPATIENT)
Dept: FAMILY MEDICINE | Facility: OTHER | Age: 33
End: 2018-01-25

## 2018-01-25 PROBLEM — Z83.3 FAMILY HISTORY OF DIABETES MELLITUS: Status: ACTIVE | Noted: 2018-01-25

## 2018-01-25 PROBLEM — F98.8 ATTENTION DEFICIT DISORDER: Status: ACTIVE | Noted: 2018-01-25

## 2018-01-25 PROBLEM — Z81.1 FAMILY HISTORY OF ALCOHOLISM: Status: ACTIVE | Noted: 2018-01-25

## 2018-01-25 PROBLEM — L27.2 DERMATITIS DUE TO FOOD TAKEN INTERNALLY: Status: ACTIVE | Noted: 2018-01-25

## 2018-01-25 PROBLEM — Z80.8 FAMILY HISTORY OF MELANOMA: Status: ACTIVE | Noted: 2018-01-25

## 2018-01-25 PROBLEM — F33.9 DEPRESSION, MAJOR, RECURRENT (H): Status: ACTIVE | Noted: 2018-01-25

## 2018-01-25 RX ORDER — BUSPIRONE HYDROCHLORIDE 5 MG/1
TABLET ORAL
COMMUNITY

## 2018-01-25 RX ORDER — PROCHLORPERAZINE MALEATE 5 MG
TABLET ORAL
COMMUNITY

## 2020-12-22 ENCOUNTER — TRANSFERRED RECORDS (OUTPATIENT)
Dept: HEALTH INFORMATION MANAGEMENT | Facility: CLINIC | Age: 35
End: 2020-12-22

## 2021-02-09 ENCOUNTER — MEDICAL CORRESPONDENCE (OUTPATIENT)
Dept: HEALTH INFORMATION MANAGEMENT | Facility: CLINIC | Age: 36
End: 2021-02-09

## 2021-02-09 ENCOUNTER — TRANSFERRED RECORDS (OUTPATIENT)
Dept: HEALTH INFORMATION MANAGEMENT | Facility: CLINIC | Age: 36
End: 2021-02-09

## 2021-02-10 ENCOUNTER — TRANSCRIBE ORDERS (OUTPATIENT)
Dept: OTHER | Age: 36
End: 2021-02-10

## 2021-02-10 DIAGNOSIS — G90.1 DYSAUTONOMIA (H): Primary | ICD-10-CM

## 2021-03-09 ENCOUNTER — PRE VISIT (OUTPATIENT)
Dept: NEUROLOGY | Facility: CLINIC | Age: 36
End: 2021-03-09

## 2021-03-09 NOTE — TELEPHONE ENCOUNTER
FUTURE VISIT INFORMATION      FUTURE VISIT INFORMATION:    Date: 3/12/2021    Time: 9am    Location: [facility]  REFERRAL INFORMATION:    Referring provider:  Dr. Leahy    Referring providers clinic:  [facility]     Reason for visit/diagnosis  DysAutonomia     RECORDS REQUESTED FROM:       Clinic name Comments Records Status Imaging Status   [facility]  Dr. Leahy-12/22/2020    MR Brain -6/10/2020 Care Everywhere Requested to PACS                                   3/9/2021-Request for IMages faxed to [facility]-MR @ 153am    3/11/2021-[facility] Images now in PACS-MR @ 545am

## 2021-03-12 ENCOUNTER — OFFICE VISIT (OUTPATIENT)
Dept: NEUROLOGY | Facility: CLINIC | Age: 36
End: 2021-03-12
Attending: PSYCHIATRY & NEUROLOGY
Payer: COMMERCIAL

## 2021-03-12 VITALS
SYSTOLIC BLOOD PRESSURE: 127 MMHG | DIASTOLIC BLOOD PRESSURE: 79 MMHG | RESPIRATION RATE: 16 BRPM | HEART RATE: 88 BPM | WEIGHT: 185 LBS | OXYGEN SATURATION: 100 %

## 2021-03-12 DIAGNOSIS — G62.9 NEUROPATHY: ICD-10-CM

## 2021-03-12 DIAGNOSIS — G62.9 NEUROPATHY: Primary | ICD-10-CM

## 2021-03-12 LAB
B BURGDOR IGG+IGM SER QL: 0.27 (ref 0–0.89)
CRP SERPL-MCNC: 13.7 MG/L (ref 0–8)
DEPRECATED CALCIDIOL+CALCIFEROL SERPL-MC: 28 UG/L (ref 20–75)
HBA1C MFR BLD: 5.3 % (ref 0–5.6)
VIT B12 SERPL-MCNC: 241 PG/ML (ref 193–986)

## 2021-03-12 PROCEDURE — 86140 C-REACTIVE PROTEIN: CPT | Performed by: PATHOLOGY

## 2021-03-12 PROCEDURE — 99000 SPECIMEN HANDLING OFFICE-LAB: CPT | Performed by: PATHOLOGY

## 2021-03-12 PROCEDURE — 83036 HEMOGLOBIN GLYCOSYLATED A1C: CPT | Performed by: PATHOLOGY

## 2021-03-12 PROCEDURE — 99205 OFFICE O/P NEW HI 60 MIN: CPT | Performed by: PSYCHIATRY & NEUROLOGY

## 2021-03-12 PROCEDURE — 84425 ASSAY OF VITAMIN B-1: CPT | Mod: 90 | Performed by: PATHOLOGY

## 2021-03-12 PROCEDURE — 86618 LYME DISEASE ANTIBODY: CPT | Mod: 90 | Performed by: PATHOLOGY

## 2021-03-12 PROCEDURE — 83520 IMMUNOASSAY QUANT NOS NONAB: CPT | Mod: 90 | Performed by: PATHOLOGY

## 2021-03-12 PROCEDURE — 86235 NUCLEAR ANTIGEN ANTIBODY: CPT | Mod: 90 | Performed by: PATHOLOGY

## 2021-03-12 PROCEDURE — 84630 ASSAY OF ZINC: CPT | Mod: 90 | Performed by: PATHOLOGY

## 2021-03-12 PROCEDURE — 99N1036 PR STATISTIC TOTAL PROTEIN: Mod: 90 | Performed by: PATHOLOGY

## 2021-03-12 PROCEDURE — 84446 ASSAY OF VITAMIN E: CPT | Mod: 90 | Performed by: PATHOLOGY

## 2021-03-12 PROCEDURE — 86255 FLUORESCENT ANTIBODY SCREEN: CPT | Mod: 90 | Performed by: PATHOLOGY

## 2021-03-12 PROCEDURE — 36415 COLL VENOUS BLD VENIPUNCTURE: CPT | Performed by: PATHOLOGY

## 2021-03-12 PROCEDURE — 84165 PROTEIN E-PHORESIS SERUM: CPT | Mod: 90 | Performed by: PATHOLOGY

## 2021-03-12 PROCEDURE — 83516 IMMUNOASSAY NONANTIBODY: CPT | Mod: 90 | Performed by: PATHOLOGY

## 2021-03-12 PROCEDURE — 82595 ASSAY OF CRYOGLOBULIN: CPT | Mod: 90 | Performed by: PATHOLOGY

## 2021-03-12 PROCEDURE — 83519 RIA NONANTIBODY: CPT | Mod: 90 | Performed by: PATHOLOGY

## 2021-03-12 PROCEDURE — 84207 ASSAY OF VITAMIN B-6: CPT | Mod: 90 | Performed by: PATHOLOGY

## 2021-03-12 PROCEDURE — 82525 ASSAY OF COPPER: CPT | Mod: 90 | Performed by: PATHOLOGY

## 2021-03-12 PROCEDURE — 82607 VITAMIN B-12: CPT | Performed by: PATHOLOGY

## 2021-03-12 PROCEDURE — 82306 VITAMIN D 25 HYDROXY: CPT | Mod: 90 | Performed by: PATHOLOGY

## 2021-03-12 PROCEDURE — 86256 FLUORESCENT ANTIBODY TITER: CPT | Mod: 90 | Performed by: PATHOLOGY

## 2021-03-12 PROCEDURE — 86431 RHEUMATOID FACTOR QUANT: CPT | Mod: 90 | Performed by: PATHOLOGY

## 2021-03-12 RX ORDER — ETONOGESTREL AND ETHINYL ESTRADIOL VAGINAL RING .015; .12 MG/D; MG/D
RING VAGINAL
COMMUNITY
Start: 2021-01-21

## 2021-03-12 RX ORDER — OMEPRAZOLE 40 MG/1
40 CAPSULE, DELAYED RELEASE ORAL
COMMUNITY
Start: 2021-02-02

## 2021-03-12 RX ORDER — METOPROLOL TARTRATE 50 MG
50 TABLET ORAL 2 TIMES DAILY
COMMUNITY
Start: 2021-02-24

## 2021-03-12 RX ORDER — SIMETHICONE 125 MG/1
CAPSULE, LIQUID FILLED ORAL
COMMUNITY
Start: 2021-02-01

## 2021-03-12 RX ORDER — ONDANSETRON 4 MG/1
TABLET, FILM COATED ORAL
COMMUNITY
Start: 2021-02-12

## 2021-03-12 RX ORDER — DEXTROAMPHETAMINE SACCHARATE, AMPHETAMINE ASPARTATE, DEXTROAMPHETAMINE SULFATE AND AMPHETAMINE SULFATE 3.75; 3.75; 3.75; 3.75 MG/1; MG/1; MG/1; MG/1
TABLET ORAL
COMMUNITY
Start: 2021-03-03

## 2021-03-12 RX ORDER — CELECOXIB 200 MG/1
CAPSULE ORAL
COMMUNITY
Start: 2021-02-27

## 2021-03-12 RX ORDER — AMITRIPTYLINE HYDROCHLORIDE 50 MG/1
TABLET ORAL
COMMUNITY
Start: 2021-02-26

## 2021-03-12 RX ORDER — EPINEPHRINE 0.3 MG/.3ML
INJECTION SUBCUTANEOUS
COMMUNITY
Start: 2020-12-30

## 2021-03-12 RX ORDER — DEXTROAMPHETAMINE SULFATE, DEXTROAMPHETAMINE SACCHARATE, AMPHETAMINE SULFATE AND AMPHETAMINE ASPARTATE 7.5; 7.5; 7.5; 7.5 MG/1; MG/1; MG/1; MG/1
CAPSULE, EXTENDED RELEASE ORAL
COMMUNITY
Start: 2021-03-03

## 2021-03-12 ASSESSMENT — PAIN SCALES - GENERAL: PAINLEVEL: MODERATE PAIN (5)

## 2021-03-12 NOTE — NURSING NOTE
Chief Complaint   Patient presents with     Consult     [facility] NEW - DYSAUTONOMIA     Kelton Dueñas

## 2021-03-12 NOTE — LETTER
3/12/2021       RE: Jodi Neri  [address]  [place] [address]     Dear Colleague,    Thank you for referring your patient, Jodi Neri, to the [facility] [place] [facility]. Please see a copy of my visit note below.    [facility] Neurology Consultation    Jodi Neri MRN# [id number]   Age: 35 year old YOB: 1985     Requesting physician: Michael Norris     Reason for Consultation: dysautonomia      History of Presenting Symptoms:   Jodi Neri is a 35 year old female who presents today for evaluation of dysautonomia.  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 Still drinks occasionally), Hypovitaminosis D, MDD (remission), b/l CTS s/p b/l release, chronic hepatitis C, and symptomatic cholelithiases.    The patient has been seen previously with neurologist, Dr. Leahy, of [facility] in [place] on 12/22/2020 for dysphagia.  In that visit, it was reported that the patient was having intermittent difficulties with swallowing (solids, liquids, mostly meat) and the dysphagia was associated with pain in the chest.  During the visit, the patient also reported multiple other symptoms; pain and hyperesthesia throughout body, short and long term memory difficulties, pupil asymmetry at times, periods of tachycardia, occipital headaches with nausea (3 weeks duration), difficulty with sweating, dizziness with positional changes, and prior abdominal pain. Exam was unremarkable. MRI of the brain from 6/2020 was unremarkable. Overall impression was for neurological and non-neurological symptoms, and there was concern for possible autonomic dysregulation or dysautonomia and referral to [facility] was made, but the [facility] declined to see the patient after review and referred her to their  fibromyalgia team.    Today, The patient is extremely worried about a few specific issues.  She has difficulty urinating, indicating she has to concentrate to start urination and then has to stop-go with urination.  She hasn't seen an urologist.    The patient notes a slight asymmetry in her left pupil, often occurring after exercising (going up stairs).  Similar stimulation (exercise) can lead to odd sensations in her hands and fingers, and tachycardia.  She reports dysesthesias with water on her feet leading to painful sensations instead of a wet sensation.  When going up stairs (5 flights) she reports odd pressure sensations in her legs, and feeling exhausted following.    The patient reports having difficulty with finding the right words for things, and feels that her mind is slipping at times.    She has worsened sweating over her hairline on the fore-head.  This is embarrassing for her and she thinks it has been going on for years.    The patient has started beta blockers due to heightened blood pressures, and is reporting this has helped with her HTN.    The patient has had a history of painful contractions of her spine, but this has improved with use of Celebrex.         Her dizziness has multiple types.  She developed room spinning dizziness after the birth of her son (one month after delivery), and initially this lasted three months.  It has come back a few times, but only lasted for a steady period about 2 months ago.  This occurred around the same time of having an ear ache or ear pressure.  She has light sensitivity with ear pressure with some of the vertigo episodes, and this can be worsened by people talking at a distance or loud sounds.  Other types of dizziness is described as getting a head rush when standing up quickly, or going up stairs in general.  Her head feels heavy, and she feels like she is about to black out.  She hasn't blacked out or passed out from this, but can be on the edge of  this.      Past Medical History:   Hepatitis C history (treated with interferons)  Drug and alcohol use (as above)     Past Surgical History:     Past Surgical History:   Procedure Laterality Date      SECTION N/A 10/23/2017    Procedure:  SECTION;  fetal intolerance of labor;  Surgeon: Kunal Aguilar MD;  Location: [facility]      Social History:   Prior drug use (as above). Continued drinking of alcohol (social drinking).     Family History:   No major cancer history     Medications:     Current Outpatient Medications   Medication Sig     busPIRone (BUSPAR) 5 MG tablet      ferrous sulfate (IRON) 325 (65 FE) MG tablet Take 1 tablet (325 mg) by mouth 2 times daily     fluticasone (FLONASE) 50 MCG/ACT spray Spray 50 sprays into both nostrils daily     ibuprofen (ADVIL/MOTRIN) 400 MG tablet Take 2 tablets (800 mg) by mouth every 6 hours as needed for other (cramping)     lanolin ointment Apply topically every hour as needed for dry skin (soreness)     loratadine (CLARITIN) 10 MG tablet Take 10 mg by mouth daily as needed     oxyCODONE (ROXICODONE) 5 MG IR tablet Take 1 tablet (5 mg) by mouth every 3 hours as needed for moderate to severe pain     Prenatal Vit-Fe Fumarate-FA (PRENATAL MULTIVITAMIN PLUS IRON) 27-0.8 MG TABS per tablet Take 1 tablet by mouth daily     prochlorperazine (COMPAZINE) 10 MG tablet Take 5 mg by mouth daily as needed     prochlorperazine (COMPAZINE) 5 MG tablet      senna-docusate (SENOKOT-S;PERICOLACE) 8.6-50 MG per tablet Take 1-2 tablets by mouth 2 times daily      Allergies:     Allergies   Allergen Reactions     Albuterol Anaphylaxis     Other reaction(s): Tongue Swelling     Nuts Anaphylaxis and Itching     Orange Fruit [Citrus] Anaphylaxis     Strawberry Anaphylaxis     Fruit Extracts       Review of Systems:   A comprehensive 10 point review of systems (constitutional, ENT, cardiac, peripheral vascular, lymphatic, respiratory, GI, , Musculoskeletal, skin, Neurological) was  performed and found to be negative except as described in this note.      Physical Exam:   Vitals: /79   Pulse 88   Resp 16   Wt 83.9 kg (185 lb)   SpO2 100%    General: Seated comfortably in no acute distress.  HEENT: Neck supple with normal range of motion. No paracervical muscle tenderness or tightness.  Optic discs sharp and vasculature normal on funduscopic exam.   Heart: Regular rate  Lungs: breathing comfortably  GI: Non tender  Extremities: no edema  Skin: No rashes  Neurologic:     Mental Status: Fully alert, attentive and oriented. Speech clear and fluent, no paraphasic errors.      Cranial Nerves: Visual fields intact. PERRL. EOMI with normal smooth pursuit. Facial sensation intact/symmetric. Facial movements symmetric. Hearing not formally tested but intact to conversation. Palate elevation symmetric, uvula midline. No dysarthria. Shoulder shrug strong bilaterally. Tongue protrusion midline.     Motor: No tremors or other abnormal movements observed. Muscle tone normal throughout. No pronator drift. Normal/symmetric rapid finger tapping. Strength 5/5 throughout upper and lower extremities.     Deep Tendon Reflexes: 2+/symmetric throughout upper and lower extremities. No clonus. Toes downgoing bilaterally.     Sensory: Intact/symmetric to light touch, pinprick, temperature, vibration and proprioception throughout upper and lower extremities. Negative Romberg.      Coordination: Finger-nose-finger and heel-shin intact without dysmetria. Rapid alternating movements intact/symmetric with normal speed and rhythm.     Gait: Normal, steady casual gait. Able to walk on toes, heels and tandem without difficulty.         Data: Pertinent prior to visit   Imaging:  Brain imaging reported as in HPI (normal 6/2020)    Laboratory:  6/1/2020  MICKY, RH - normal  CRP - 2.8 (elevated, normal between 0.0 and 0.8)    10/16/2019  Vitamin D -normal (40)    4/8/2019  TSH, T4 - normal    9/4/2018:  SPEP - alpha 2 and  beta 1 elevations (no monoclonal protein detected)    8/23/2018  CK, ANCA, MICKY, CBC, CMP - normal  ESR - 26 (elevated slightly, normal range 0-25)  CRP - 2.4 (elevated)         Assessment and Plan:   Assessment:  Possible small fiber neuropathy with generalized hyperhydrosis     The patient has multiple symptoms but most concerning for her are urinary incontinence, hyper-hydrosis, paresthesias of the hands and feet, and light-headedness possibly associated with posture.  She has had no urology or cardiology w/up for her periodic tachycardia, postural dizziness, and urinary incontinence and these should be done before consideration of a primary dysautonomia is made.  The patient has had reoccurring elevated CRP and ESR, which may support an underlying inflammatory process (likely fibromyalgia) which could lead to a small fiber neuropathy. Given her positive history of hepatitis C, further testing for cryoglobulins along with other possible etiologies of a small fiber neuropathy should be done.  If positive results are noted on serum studies, or from studies through urology/cardiology, then a referral to an autonomic specialist could be made through her primary neurologist in [place].  At [facility] there is one provider who is doing autonomic testing (see below).     Plan:  - Would recommend referral to Urology (can be done in [place])  - Cryoglobulins, SPEP, immunofixation, paraneoplastic, A1c, antigliadin and endomysial antibodies, copper, Vitamin B1/6/12/D/E, Zinc, SSA, SSB, Rh, Lyme panel  - Tilt table testing (to be ordered by primary neurologist upon their return, can be done in [place])  - Referral to autonomic specialist (Dr. Monsivais of [facility] can be done once other testing mentioned above is done    Follow up in Neurology clinic as needed should new concerns arise. Otherwise, please return to your primary neurologist in [place] for continued evaluations.    NICHOLAS Hull  OVIDIO   of Neurology    Total time (70 min) in this patient encounter was spent on counseling and/or coordination of care. We reviewed diagnostic results, impressions, and discussed other possible tests if symptoms do not improve. We discussed the implications of the diagnosis, as well as risks and benefits of management options. We reviewed treatment instructions and our scheduled follow-up as specified in the discharge plan. We also discussed the importance of compliance with the chosen course of treatment. The patient is in agreement with this plan and has no further questions.

## 2021-03-12 NOTE — PATIENT INSTRUCTIONS
I suspect you have a small fiber neuropathy leading to your symptoms. I think basic and expansive testing through serum studies is the first step in determining a reason, but following this further consultations to urology, and cardiology should be done.  This can be done in [place] and you should follow with your neurologist there to further go over results/referrals/symptoms.  If positive results are seen that suggest small fiber neuropathy, then referral to Dr. Monsivais at [facility] may be a good option for further autonomic studies.

## 2021-03-12 NOTE — PROGRESS NOTES
[facility] Neurology Consultation    Jodi Neri MRN# [id number]   Age: 35 year old YOB: 1985     Requesting physician: Michael Norris     Reason for Consultation: dysautonomia      History of Presenting Symptoms:   Jodi Neri is a 35 year old female who presents today for evaluation of dysautonomia.  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 Still drinks occasionally), Hypovitaminosis D, MDD (remission), b/l CTS s/p b/l release, chronic hepatitis C, and symptomatic cholelithiases.    The patient has been seen previously with neurologist, Dr. Leahy, of [facility] in [place] on 12/22/2020 for dysphagia.  In that visit, it was reported that the patient was having intermittent difficulties with swallowing (solids, liquids, mostly meat) and the dysphagia was associated with pain in the chest.  During the visit, the patient also reported multiple other symptoms; pain and hyperesthesia throughout body, short and long term memory difficulties, pupil asymmetry at times, periods of tachycardia, occipital headaches with nausea (3 weeks duration), difficulty with sweating, dizziness with positional changes, and prior abdominal pain. Exam was unremarkable. MRI of the brain from 6/2020 was unremarkable. Overall impression was for neurological and non-neurological symptoms, and there was concern for possible autonomic dysregulation or dysautonomia and referral to [facility] was made, but the [facility] declined to see the patient after review and referred her to their fibromyalgia team.    Today, The patient is extremely worried about a few specific issues.  She has difficulty urinating, indicating she has to concentrate to start urination and then has to stop-go with urination.  She hasn't seen an urologist.    The patient notes a slight asymmetry in her left pupil, often occurring after  exercising (going up stairs).  Similar stimulation (exercise) can lead to odd sensations in her hands and fingers, and tachycardia.  She reports dysesthesias with water on her feet leading to painful sensations instead of a wet sensation.  When going up stairs (5 flights) she reports odd pressure sensations in her legs, and feeling exhausted following.    The patient reports having difficulty with finding the right words for things, and feels that her mind is slipping at times.    She has worsened sweating over her hairline on the fore-head.  This is embarrassing for her and she thinks it has been going on for years.    The patient has started beta blockers due to heightened blood pressures, and is reporting this has helped with her HTN.    The patient has had a history of painful contractions of her spine, but this has improved with use of Celebrex.         Her dizziness has multiple types.  She developed room spinning dizziness after the birth of her son (one month after delivery), and initially this lasted three months.  It has come back a few times, but only lasted for a steady period about 2 months ago.  This occurred around the same time of having an ear ache or ear pressure.  She has light sensitivity with ear pressure with some of the vertigo episodes, and this can be worsened by people talking at a distance or loud sounds.  Other types of dizziness is described as getting a head rush when standing up quickly, or going up stairs in general.  Her head feels heavy, and she feels like she is about to black out.  She hasn't blacked out or passed out from this, but can be on the edge of this.      Past Medical History:   Hepatitis C history (treated with interferons)  Drug and alcohol use (as above)     Past Surgical History:     Past Surgical History:   Procedure Laterality Date      SECTION N/A 10/23/2017    Procedure:  SECTION;  fetal intolerance of labor;  Surgeon: Kunal Aguilar MD;   Location: [facility]      Social History:   Prior drug use (as above). Continued drinking of alcohol (social drinking).     Family History:   No major cancer history     Medications:     Current Outpatient Medications   Medication Sig     busPIRone (BUSPAR) 5 MG tablet      ferrous sulfate (IRON) 325 (65 FE) MG tablet Take 1 tablet (325 mg) by mouth 2 times daily     fluticasone (FLONASE) 50 MCG/ACT spray Spray 50 sprays into both nostrils daily     ibuprofen (ADVIL/MOTRIN) 400 MG tablet Take 2 tablets (800 mg) by mouth every 6 hours as needed for other (cramping)     lanolin ointment Apply topically every hour as needed for dry skin (soreness)     loratadine (CLARITIN) 10 MG tablet Take 10 mg by mouth daily as needed     oxyCODONE (ROXICODONE) 5 MG IR tablet Take 1 tablet (5 mg) by mouth every 3 hours as needed for moderate to severe pain     Prenatal Vit-Fe Fumarate-FA (PRENATAL MULTIVITAMIN PLUS IRON) 27-0.8 MG TABS per tablet Take 1 tablet by mouth daily     prochlorperazine (COMPAZINE) 10 MG tablet Take 5 mg by mouth daily as needed     prochlorperazine (COMPAZINE) 5 MG tablet      senna-docusate (SENOKOT-S;PERICOLACE) 8.6-50 MG per tablet Take 1-2 tablets by mouth 2 times daily      Allergies:     Allergies   Allergen Reactions     Albuterol Anaphylaxis     Other reaction(s): Tongue Swelling     Nuts Anaphylaxis and Itching     Orange Fruit [Citrus] Anaphylaxis     Strawberry Anaphylaxis     Fruit Extracts       Review of Systems:   A comprehensive 10 point review of systems (constitutional, ENT, cardiac, peripheral vascular, lymphatic, respiratory, GI, , Musculoskeletal, skin, Neurological) was performed and found to be negative except as described in this note.      Physical Exam:   Vitals: /79   Pulse 88   Resp 16   Wt 83.9 kg (185 lb)   SpO2 100%    General: Seated comfortably in no acute distress.  HEENT: Neck supple with normal range of motion. No paracervical muscle tenderness or tightness.   Optic discs sharp and vasculature normal on funduscopic exam.   Heart: Regular rate  Lungs: breathing comfortably  GI: Non tender  Extremities: no edema  Skin: No rashes  Neurologic:     Mental Status: Fully alert, attentive and oriented. Speech clear and fluent, no paraphasic errors.      Cranial Nerves: Visual fields intact. PERRL. EOMI with normal smooth pursuit. Facial sensation intact/symmetric. Facial movements symmetric. Hearing not formally tested but intact to conversation. Palate elevation symmetric, uvula midline. No dysarthria. Shoulder shrug strong bilaterally. Tongue protrusion midline.     Motor: No tremors or other abnormal movements observed. Muscle tone normal throughout. No pronator drift. Normal/symmetric rapid finger tapping. Strength 5/5 throughout upper and lower extremities.     Deep Tendon Reflexes: 2+/symmetric throughout upper and lower extremities. No clonus. Toes downgoing bilaterally.     Sensory: Intact/symmetric to light touch, pinprick, temperature, vibration and proprioception throughout upper and lower extremities. Negative Romberg.      Coordination: Finger-nose-finger and heel-shin intact without dysmetria. Rapid alternating movements intact/symmetric with normal speed and rhythm.     Gait: Normal, steady casual gait. Able to walk on toes, heels and tandem without difficulty.         Data: Pertinent prior to visit   Imaging:  Brain imaging reported as in HPI (normal 6/2020)    Laboratory:  6/1/2020  MICKY, RH - normal  CRP - 2.8 (elevated, normal between 0.0 and 0.8)    10/16/2019  Vitamin D -normal (40)    4/8/2019  TSH, T4 - normal    9/4/2018:  SPEP - alpha 2 and beta 1 elevations (no monoclonal protein detected)    8/23/2018  CK, ANCA, MICKY, CBC, CMP - normal  ESR - 26 (elevated slightly, normal range 0-25)  CRP - 2.4 (elevated)         Assessment and Plan:   Assessment:  Possible small fiber neuropathy with generalized hyperhydrosis     The patient has multiple symptoms but  most concerning for her are urinary incontinence, hyper-hydrosis, paresthesias of the hands and feet, and light-headedness possibly associated with posture.  She has had no urology or cardiology w/up for her periodic tachycardia, postural dizziness, and urinary incontinence and these should be done before consideration of a primary dysautonomia is made.  The patient has had reoccurring elevated CRP and ESR, which may support an underlying inflammatory process (likely fibromyalgia) which could lead to a small fiber neuropathy. Given her positive history of hepatitis C, further testing for cryoglobulins along with other possible etiologies of a small fiber neuropathy should be done.  If positive results are noted on serum studies, or from studies through urology/cardiology, then a referral to an autonomic specialist could be made through her primary neurologist in [place].  At [facility] there is one provider who is doing autonomic testing (see below).     Plan:  - Would recommend referral to Urology (can be done in [place])  - Cryoglobulins, SPEP, immunofixation, paraneoplastic, A1c, antigliadin and endomysial antibodies, copper, Vitamin B1/6/12/D/E, Zinc, SSA, SSB, Rh, Lyme panel  - Tilt table testing (to be ordered by primary neurologist upon their return, can be done in [place])  - Referral to autonomic specialist (Dr. Monsivais of [facility]) can be done once other testing mentioned above is done    Follow up in Neurology clinic as needed should new concerns arise. Otherwise, please return to your primary neurologist in [place] for continued evaluations.    NICHOLAS Hull D.O.   of Neurology    Total time (70 min) in this patient encounter was spent on counseling and/or coordination of care. We reviewed diagnostic results, impressions, and discussed other possible tests if symptoms do not improve. We discussed the implications of the diagnosis, as well as risks and benefits of  management options. We reviewed treatment instructions and our scheduled follow-up as specified in the discharge plan. We also discussed the importance of compliance with the chosen course of treatment. The patient is in agreement with this plan and has no further questions.

## 2021-03-14 LAB
ENDOMYSIUM IGA TITR SER IF: NORMAL {TITER}
ZINC SERPL-MCNC: 71.3 UG/DL (ref 60–120)

## 2021-03-15 LAB
A-TOCOPHEROL VIT E SERPL-MCNC: 11.9 MG/L (ref 5.5–18)
ALBUMIN SERPL ELPH-MCNC: 4.3 G/DL (ref 3.7–5.1)
ALPHA1 GLOB SERPL ELPH-MCNC: 0.4 G/DL (ref 0.2–0.4)
ALPHA2 GLOB SERPL ELPH-MCNC: 0.9 G/DL (ref 0.5–0.9)
B-GLOBULIN SERPL ELPH-MCNC: 0.9 G/DL (ref 0.6–1)
BETA+GAMMA TOCOPHEROL SERPL-MCNC: 1.7 MG/L (ref 0–6)
ENA SS-A IGG SER IA-ACNC: <0.2 AI (ref 0–0.9)
ENA SS-B IGG SER IA-ACNC: <0.2 AI (ref 0–0.9)
GAMMA GLOB SERPL ELPH-MCNC: 0.8 G/DL (ref 0.7–1.6)
M PROTEIN SERPL ELPH-MCNC: 0.1 G/DL
PROT PATTERN SERPL ELPH-IMP: ABNORMAL
RHEUMATOID FACT SER NEPH-ACNC: 11 IU/ML (ref 0–20)
TTG IGA SER-ACNC: <1 U/ML
TTG IGG SER-ACNC: <1 U/ML

## 2021-03-16 ENCOUNTER — TELEPHONE (OUTPATIENT)
Dept: NEUROLOGY | Facility: CLINIC | Age: 36
End: 2021-03-16

## 2021-03-16 LAB
COPPER SERPL-MCNC: 245.6 UG/DL (ref 80–155)
VIT B1 BLD-MCNC: 101 NMOL/L (ref 70–180)
VIT B6 SERPL-MCNC: 41.2 NMOL/L (ref 20–125)

## 2021-03-16 NOTE — TELEPHONE ENCOUNTER
I spoke with Jodi about her elevated Copper and CRP.  I suspect she has some degree of chronic inflammatory process and further investigation is needed from her primary neurologist in [place], PCP in [place], and other specialist there to determine a possible etiology.  I will contact the patient once other testing is completed as it may provide further insight into an etiology (Paraneoplastic panel, cryoglobulins given HepC history).    NICHOLAS Hull D.O.  [facility] Neurology

## 2021-03-18 LAB — CRYOGLOB SER QL: ABNORMAL %

## 2021-03-22 LAB — PNP ABY SERUM: NORMAL

## 2021-04-25 ENCOUNTER — HEALTH MAINTENANCE LETTER (OUTPATIENT)
Age: 36
End: 2021-04-25

## 2021-05-20 ENCOUNTER — TELEPHONE (OUTPATIENT)
Dept: NEUROLOGY | Facility: CLINIC | Age: 36
End: 2021-05-20

## 2021-05-20 NOTE — TELEPHONE ENCOUNTER
I called patient and left detailed voicemail stating that form has not been received and that form can be faxed to [phone] ATT: Tereza. I called patient 4/26/21with the same voicemail. Patient has my direct line in case patient needs

## 2021-05-26 ENCOUNTER — DOCUMENTATION ONLY (OUTPATIENT)
Dept: NEUROLOGY | Facility: CLINIC | Age: 36
End: 2021-05-26

## 2021-06-02 ENCOUNTER — DOCUMENTATION ONLY (OUTPATIENT)
Dept: NEUROLOGY | Facility: CLINIC | Age: 36
End: 2021-06-02

## 2021-09-30 ENCOUNTER — VIRTUAL VISIT (OUTPATIENT)
Dept: NEUROLOGY | Facility: CLINIC | Age: 36
End: 2021-09-30
Payer: COMMERCIAL

## 2021-09-30 DIAGNOSIS — G62.9 NEUROPATHY: Primary | ICD-10-CM

## 2021-09-30 PROCEDURE — 99214 OFFICE O/P EST MOD 30 MIN: CPT | Mod: 95 | Performed by: PSYCHIATRY & NEUROLOGY

## 2021-09-30 RX ORDER — FLUOXETINE 40 MG/1
40 CAPSULE ORAL
COMMUNITY
Start: 2021-06-17

## 2021-09-30 RX ORDER — AMLODIPINE BESYLATE 5 MG/1
TABLET ORAL
COMMUNITY
Start: 2021-07-01

## 2021-09-30 RX ORDER — IVABRADINE 5 MG/1
5 TABLET, FILM COATED ORAL
COMMUNITY
Start: 2021-06-15

## 2021-09-30 RX ORDER — BUPROPION HYDROCHLORIDE 150 MG/1
150 TABLET, EXTENDED RELEASE ORAL
COMMUNITY
Start: 2021-08-13

## 2021-09-30 RX ORDER — BENZOCAINE/MENTHOL 6 MG-10 MG
LOZENGE MUCOUS MEMBRANE
COMMUNITY
Start: 2021-09-24 | End: 2023-09-07

## 2021-09-30 RX ORDER — GABAPENTIN 400 MG/1
400 CAPSULE ORAL
COMMUNITY
Start: 2021-07-01

## 2021-09-30 RX ORDER — ALPRAZOLAM 1 MG
TABLET ORAL
COMMUNITY
Start: 2021-09-14

## 2021-09-30 NOTE — PROGRESS NOTES
Jodi is a 36 year old who is being evaluated via a billable video visit.      How would you like to obtain your AVS? MyChart  If the video visit is dropped, the invitation should be resent by: Send to e-mail at: [email or web address]  Will anyone else be joining your video visit? No      Video Start Time: 0930  Video-Visit Details    Type of service:  Video Visit    Video End Time:10:06 AM    Originating Location (pt. Location): Home    Distant Location (provider location):  [facility] NEUROLOGY [facility]     [facility]    Chief Complaint   Patient presents with     RECHECK     VIDEO VISIT RETURN      Herb Aleman

## 2021-09-30 NOTE — LETTER
9/30/2021       RE: Jodi Neri  [address]  [place] [address]     Dear Colleague,    Thank you for referring your patient, Jodi Neri, to the [facility] [place] at [facility]. Please see a copy of my visit note below.    [facility] Neurology Follow Up Visit    Jodi Neri MRN# [id number]   Age: 36 year old YOB: 1985     Brief history of symptoms: The patient was initially seen in neurologic consultation on 3/12/2021 for evaluation of dysautonomia. Please see the comprehensive neurologic consultation notes from those dates in the Epic records for details.     Overall impression was for possible small fiber neuropathy leading to hyperhydrosis, paresthesias of hands and feet, and orthostatic hypotension like symptoms.  Urology referral in [place] was recommended, as was tilt table (could be ordered by her primary neurologist in [place]).  Testing for common causes of small fiber neuropathy as well as atypical causes was also to be done, and referral to autonomic neurologist for evaluation was to be considered pending results.    Serum studies:  Cryoglobulins, SPEP, immunofixation, paraneoplastic, A1c, antigliadin and endomysial antibodies, copper, Vitamin B1/6/12/D/E, Zinc, SSA, SSB, Rh, Lyme panel.  ~ a small monoclonal protein in the gamma fraction was noted, copper was slightly elevated as was CRP.    The patient was seen with Dr. 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  It was thought that her peripheral neuropathy was likely residual from before 2015, and possibly related to substance abuse and hepatitis C with interferon.  Non-contrast CT was unrevealing for other etiologies of small monoclonal protein markers, and testing with immunofixation and light-chain analysis was also unrevealing.   It was not thought that her autonomic neuropathy was contributing to her urinary and esophageal symptoms.    Interval history: The patient continues to have issues with urination and is to see a urologist soon for advanced testing.  Her rheumatology appointment was rescheduled and she will follow up with them in the near future for further management of her prior Hepatitis C infection and related secondary issues from interferon therapy.         Assessment and Plan:   Assessment:  Autonomic neuropathy     The patient and I had a long discussion about her autonomic neuropathy with likely small fiber involvement, and that it is difficult to tie this directly to her ongoing urinary and esophageal symptoms.  We discussed that multiple severe etiologies have been ruled out, and it is likely her etiology will remain unknown.  I suggested she continue to work with her PCP in determining if any other non-neurological issues are occurring as her other neurological studies and testing have otherwise been unrevealing (besides the autonomic neuropathy) for cause.  At this point, there was little I am able to offer in terms of treatment for her urinary, sensory, or esophageal symptoms and would consider her w/up for neurological causes to be relatively complete and extensive given the images obtained, and other serum/EMG/small fiber testings done.    Plan:  Discharged from clinic, follow up as needed    NICHOLAS Hull D.O.   of Neurology    Total time today (35 min) in this patient encounter was spent on pre-charting, counseling and/or coordination of care.         Again, thank you for allowing me to participate in the care of your patient.      Sincerely,    Long Hull, DO

## 2021-09-30 NOTE — PROGRESS NOTES
[facility] Neurology Follow Up Visit    Jodi Neri MRN# [id number]   Age: 36 year old YOB: 1985     Brief history of symptoms: The patient was initially seen in neurologic consultation on 3/12/2021 for evaluation of dysautonomia. Please see the comprehensive neurologic consultation notes from those dates in the Epic records for details.     Overall impression was for possible small fiber neuropathy leading to hyperhydrosis, paresthesias of hands and feet, and orthostatic hypotension like symptoms.  Urology referral in [place] was recommended, as was tilt table (could be ordered by her primary neurologist in [place]).  Testing for common causes of small fiber neuropathy as well as atypical causes was also to be done, and referral to autonomic neurologist for evaluation was to be considered pending results.    Serum studies:  Cryoglobulins, SPEP, immunofixation, paraneoplastic, A1c, antigliadin and endomysial antibodies, copper, Vitamin B1/6/12/D/E, Zinc, SSA, SSB, Rh, Lyme panel.  ~ a small monoclonal protein in the gamma fraction was noted, copper was slightly elevated as was CRP.    The patient was seen with Dr. 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  It was thought that her peripheral neuropathy was likely residual from before 2015, and possibly related to substance abuse and hepatitis C with interferon.  Non-contrast CT was unrevealing for other etiologies of small monoclonal protein markers, and testing with immunofixation and light-chain analysis was also unrevealing.  It was not thought that her autonomic neuropathy was contributing to her urinary and esophageal symptoms.    Interval history: The patient continues to have issues with urination and is to see a urologist soon for advanced testing.  Her rheumatology appointment was rescheduled and she will follow up with them in the  near future for further management of her prior Hepatitis C infection and related secondary issues from interferon therapy.         Assessment and Plan:   Assessment:  Autonomic neuropathy     The patient and I had a long discussion about her autonomic neuropathy with likely small fiber involvement, and that it is difficult to tie this directly to her ongoing urinary and esophageal symptoms.  We discussed that multiple severe etiologies have been ruled out, and it is likely her etiology will remain unknown.  I suggested she continue to work with her PCP in determining if any other non-neurological issues are occurring as her other neurological studies and testing have otherwise been unrevealing (besides the autonomic neuropathy) for cause.  At this point, there was little I am able to offer in terms of treatment for her urinary, sensory, or esophageal symptoms and would consider her w/up for neurological causes to be relatively complete and extensive given the images obtained, and other serum/EMG/small fiber testings done.    Plan:  Discharged from clinic, follow up as needed    NICHOLAS Hull D.O.   of Neurology    Total time today (35 min) in this patient encounter was spent on pre-charting, counseling and/or coordination of care.

## 2021-10-09 ENCOUNTER — HEALTH MAINTENANCE LETTER (OUTPATIENT)
Age: 36
End: 2021-10-09

## 2022-05-21 ENCOUNTER — HEALTH MAINTENANCE LETTER (OUTPATIENT)
Age: 37
End: 2022-05-21

## 2022-09-17 ENCOUNTER — HEALTH MAINTENANCE LETTER (OUTPATIENT)
Age: 37
End: 2022-09-17

## 2022-10-12 ENCOUNTER — TRANSFERRED RECORDS (OUTPATIENT)
Dept: HEALTH INFORMATION MANAGEMENT | Facility: OTHER | Age: 37
End: 2022-10-12

## 2023-03-04 ENCOUNTER — TRANSFERRED RECORDS (OUTPATIENT)
Dept: HEALTH INFORMATION MANAGEMENT | Facility: OTHER | Age: 38
End: 2023-03-04
Payer: COMMERCIAL

## 2023-06-04 ENCOUNTER — HEALTH MAINTENANCE LETTER (OUTPATIENT)
Age: 38
End: 2023-06-04

## 2023-09-07 DIAGNOSIS — L27.2 DERMATITIS DUE TO FOOD TAKEN INTERNALLY: Primary | ICD-10-CM

## 2023-09-10 RX ORDER — BENZOCAINE/MENTHOL 6 MG-10 MG
LOZENGE MUCOUS MEMBRANE 2 TIMES DAILY
Qty: 28 G | Refills: 3 | Status: SHIPPED | OUTPATIENT
Start: 2023-09-10

## 2023-09-15 DIAGNOSIS — F41.1 GENERALIZED ANXIETY DISORDER: ICD-10-CM

## 2023-09-15 RX ORDER — BUSPIRONE HYDROCHLORIDE 10 MG/1
TABLET ORAL
Qty: 60 TABLET | Refills: 0 | OUTPATIENT
Start: 2023-09-15

## 2023-09-15 RX ORDER — BUSPIRONE HYDROCHLORIDE 5 MG/1
TABLET ORAL
OUTPATIENT
Start: 2023-09-15

## 2023-09-15 NOTE — TELEPHONE ENCOUNTER
Patient has new pcp ANEL ULLOA [[id number]] , no longer patient here, refusing med at this time.  Kristen Centeno RN on 9/15/2023 at 10:28 AM

## 2023-09-15 NOTE — TELEPHONE ENCOUNTER
Patient no longer seen here, has new PCP ANEL ULLOA [[id number]], refusing refill.  Kristen Centeno RN on 9/15/2023 at 10:29 AM

## 2024-07-13 ENCOUNTER — HEALTH MAINTENANCE LETTER (OUTPATIENT)
Age: 39
End: 2024-07-13

## 2025-03-25 ENCOUNTER — TRANSFERRED RECORDS (OUTPATIENT)
Dept: HEALTH INFORMATION MANAGEMENT | Facility: OTHER | Age: 40
End: 2025-03-25

## (undated) DEVICE — APPLICATOR-CHLORAPREP 26ML TINTED CHG 2%+ 70% IPA-SURGICAL

## (undated) DEVICE — LIGHT HANDLE COVER

## (undated) DEVICE — SUTURE-VICRYL 1 CTX J371H

## (undated) DEVICE — IRRIGATION-NACL 1000ML

## (undated) DEVICE — PACK-C-SECTION-CUSTOM

## (undated) DEVICE — SUTURE-VICRYL 2-0 CT J357H

## (undated) DEVICE — GLV-7.0 PROTEXIS PI CLASSIC LF/PF

## (undated) DEVICE — SURGICAL BINDER-ABDOMINAL 46-62

## (undated) DEVICE — GOWN-SURG XL LVL 4 IMPERVIOUS

## (undated) DEVICE — TOWEL-OR DISP 4PKS

## (undated) DEVICE — TAPE-MEDIPORE 4" X 2YD

## (undated) DEVICE — CAUTERY PAD-POLYHESIVE II ADULT

## (undated) DEVICE — SYRINGE-ASEPTO IRRIGATION

## (undated) DEVICE — IRRIGATION-H2O 1000ML

## (undated) DEVICE — SUTURE-VICRYL 3-0 CT J356H

## (undated) DEVICE — DRSG-SPONGE STERILE 8 X 4